# Patient Record
Sex: FEMALE | Race: WHITE | Employment: FULL TIME | URBAN - METROPOLITAN AREA
[De-identification: names, ages, dates, MRNs, and addresses within clinical notes are randomized per-mention and may not be internally consistent; named-entity substitution may affect disease eponyms.]

---

## 2019-04-10 ENCOUNTER — OFFICE VISIT (OUTPATIENT)
Dept: INTERNAL MEDICINE CLINIC | Age: 44
End: 2019-04-10
Payer: COMMERCIAL

## 2019-04-10 VITALS
HEIGHT: 62 IN | RESPIRATION RATE: 16 BRPM | HEART RATE: 84 BPM | BODY MASS INDEX: 33.13 KG/M2 | WEIGHT: 180 LBS | DIASTOLIC BLOOD PRESSURE: 82 MMHG | SYSTOLIC BLOOD PRESSURE: 122 MMHG

## 2019-04-10 DIAGNOSIS — Z00.00 PHYSICAL EXAM: Primary | ICD-10-CM

## 2019-04-10 DIAGNOSIS — Z13.0 SCREENING FOR DEFICIENCY ANEMIA: ICD-10-CM

## 2019-04-10 DIAGNOSIS — Z13.1 DIABETES MELLITUS SCREENING: ICD-10-CM

## 2019-04-10 DIAGNOSIS — Z13.220 LIPID SCREENING: ICD-10-CM

## 2019-04-10 DIAGNOSIS — Z12.39 BREAST CANCER SCREENING: ICD-10-CM

## 2019-04-10 DIAGNOSIS — E11.8 TYPE 2 DIABETES MELLITUS WITH COMPLICATION, WITHOUT LONG-TERM CURRENT USE OF INSULIN (HCC): ICD-10-CM

## 2019-04-10 DIAGNOSIS — Z12.4 CERVICAL CANCER SCREENING: ICD-10-CM

## 2019-04-10 DIAGNOSIS — Z13.29 THYROID DISORDER SCREENING: ICD-10-CM

## 2019-04-10 LAB — HBA1C MFR BLD: 11.7 %

## 2019-04-10 PROCEDURE — 99396 PREV VISIT EST AGE 40-64: CPT | Performed by: NURSE PRACTITIONER

## 2019-04-10 PROCEDURE — 83036 HEMOGLOBIN GLYCOSYLATED A1C: CPT | Performed by: NURSE PRACTITIONER

## 2019-04-10 RX ORDER — LISINOPRIL 5 MG/1
2.5 TABLET ORAL DAILY
Qty: 30 TABLET | Refills: 5 | Status: SHIPPED | OUTPATIENT
Start: 2019-04-10 | End: 2020-04-02

## 2019-04-10 RX ORDER — BUPROPION HYDROCHLORIDE 200 MG/1
200 TABLET, EXTENDED RELEASE ORAL DAILY
COMMUNITY
Start: 2018-03-13

## 2019-04-10 RX ORDER — LEVONORGESTREL AND ETHINYL ESTRADIOL 0.15-0.03
1 KIT ORAL DAILY
COMMUNITY
Start: 2019-04-04 | End: 2021-04-29

## 2019-04-10 SDOH — HEALTH STABILITY: MENTAL HEALTH: HOW OFTEN DO YOU HAVE A DRINK CONTAINING ALCOHOL?: 2-3 TIMES A WEEK

## 2019-04-10 ASSESSMENT — PATIENT HEALTH QUESTIONNAIRE - PHQ9
1. LITTLE INTEREST OR PLEASURE IN DOING THINGS: 1
SUM OF ALL RESPONSES TO PHQ QUESTIONS 1-9: 1
2. FEELING DOWN, DEPRESSED OR HOPELESS: 0
SUM OF ALL RESPONSES TO PHQ9 QUESTIONS 1 & 2: 1
SUM OF ALL RESPONSES TO PHQ QUESTIONS 1-9: 1

## 2019-04-10 ASSESSMENT — ENCOUNTER SYMPTOMS
SORE THROAT: 0
CHEST TIGHTNESS: 0
DIARRHEA: 0
TROUBLE SWALLOWING: 0
BACK PAIN: 0
NAUSEA: 0
COUGH: 0
WHEEZING: 0
SHORTNESS OF BREATH: 0
EYE PAIN: 0
SINUS PRESSURE: 0
ABDOMINAL PAIN: 0
PHOTOPHOBIA: 0
SINUS PAIN: 0
ABDOMINAL DISTENTION: 0
VOMITING: 0
RHINORRHEA: 0
CONSTIPATION: 0

## 2019-04-10 NOTE — PROGRESS NOTES
Pt is here for: physical exam, elevated blood sugars     Chief Complaint   Patient presents with    New Patient     high glucose       HPI  This is a 38 yr old CF, with a known past medical hx that includes anxiety/depression, that presents today to establish care and obtain physical exam.   Mammogram-> to be performed later today  GYN- up to date, records requested  Patient states that she had a screening test done at work in which her blood glucose was >300, presents to have evaluation today. Reports polydipsia, denies polyphagia, polyuria. Denies numbness/tingling to feet. Does report intermittent visual blurring.      /82 (Site: Left Upper Arm, Position: Sitting, Cuff Size: Large Adult)   Pulse 84   Resp 16   Ht 5' 2\" (1.575 m)   Wt 180 lb (81.6 kg)   BMI 32.92 kg/m²       Past Medical History:   Diagnosis Date    Anxiety     Depression        Past Surgical History:   Procedure Laterality Date     SECTION         No Known Allergies    Outpatient Medications Marked as Taking for the 4/10/19 encounter (Office Visit) with VICKI Rodriguez CNP   Medication Sig Dispense Refill    levonorgestrel-ethinyl estradiol (SEASONALE) 0.15-0.03 MG per tablet Take 1 tablet by mouth      buPROPion (WELLBUTRIN SR) 200 MG extended release tablet Take 200 mg by mouth         Family History   Problem Relation Age of Onset    Heart Disease Mother     High Blood Pressure Mother     Kidney Disease Mother     Colon Cancer Maternal Grandmother     Cancer Paternal Grandmother        Social History     Socioeconomic History    Marital status: Single     Spouse name: Not on file    Number of children: Not on file    Years of education: Not on file    Highest education level: Not on file   Occupational History    Not on file   Social Needs    Financial resource strain: Not on file    Food insecurity:     Worry: Not on file     Inability: Not on file    Transportation needs:     Medical: Not on file     Non-medical: Not on file   Tobacco Use    Smoking status: Former Smoker     Start date: 4/10/1989     Last attempt to quit: 2010     Years since quittin.2    Smokeless tobacco: Never Used   Substance and Sexual Activity    Alcohol use: Yes     Comment: 10/week    Drug use: Never    Sexual activity: Not on file   Lifestyle    Physical activity:     Days per week: Not on file     Minutes per session: Not on file    Stress: Not on file   Relationships    Social connections:     Talks on phone: Not on file     Gets together: Not on file     Attends Quaker service: Not on file     Active member of club or organization: Not on file     Attends meetings of clubs or organizations: Not on file     Relationship status: Not on file    Intimate partner violence:     Fear of current or ex partner: Not on file     Emotionally abused: Not on file     Physically abused: Not on file     Forced sexual activity: Not on file   Other Topics Concern    Not on file   Social History Narrative    Not on file       Review of Systems   Constitutional: Negative for activity change, appetite change, fatigue, fever and unexpected weight change. HENT: Negative for congestion, postnasal drip, rhinorrhea, sinus pressure, sinus pain, sneezing, sore throat and trouble swallowing. Eyes: Negative for photophobia and pain. Respiratory: Negative for cough, chest tightness, shortness of breath and wheezing. Cardiovascular: Negative for chest pain, palpitations and leg swelling. Gastrointestinal: Negative for abdominal distention, abdominal pain, constipation, diarrhea, nausea and vomiting. Endocrine: Positive for polyuria. Negative for polydipsia and polyphagia. Genitourinary: Negative for difficulty urinating, dysuria and hematuria. Musculoskeletal: Negative for back pain and neck pain. Skin: Negative for rash. Neurological: Negative for dizziness, tremors, syncope, weakness, numbness and headaches. Psychiatric/Behavioral: Negative for confusion, self-injury and sleep disturbance. Physical Exam   Nursing note reviewed  /82 (Site: Left Upper Arm, Position: Sitting, Cuff Size: Large Adult)   Pulse 84   Resp 16   Ht 5' 2\" (1.575 m)   Wt 180 lb (81.6 kg)   BMI 32.92 kg/m²   BP Readings from Last 3 Encounters:   04/10/19 122/82     Wt Readings from Last 3 Encounters:   04/10/19 180 lb (81.6 kg)      Body mass index is 32.92 kg/m². No results found for this visit on 04/10/19. Lab Review   No visits with results within 2 Month(s) from this visit. Latest known visit with results is:   No results found for any previous visit. Physical Exam   Constitutional: She is oriented to person, place, and time. She appears well-developed and well-nourished. HENT:   Head: Normocephalic. Right Ear: No tenderness. Tympanic membrane is not erythematous. Left Ear: No tenderness. Tympanic membrane is not erythematous. Nose: Right sinus exhibits no maxillary sinus tenderness and no frontal sinus tenderness. Left sinus exhibits no maxillary sinus tenderness and no frontal sinus tenderness. Mouth/Throat: No oropharyngeal exudate, posterior oropharyngeal edema, posterior oropharyngeal erythema or tonsillar abscesses. Eyes: Pupils are equal, round, and reactive to light. Right eye exhibits no discharge. Left eye exhibits no discharge. Neck: Normal range of motion. No JVD present. No tracheal deviation present. No thyromegaly present. Cardiovascular: Normal rate, regular rhythm and normal heart sounds. No murmur heard. Pulmonary/Chest: Effort normal and breath sounds normal. No stridor. No respiratory distress. She has no decreased breath sounds. She has no wheezes. She has no rales. She exhibits no tenderness. Abdominal: Soft. Bowel sounds are normal. She exhibits no distension and no mass. There is no tenderness. There is no rebound and no guarding.    Musculoskeletal: Normal range of motion. She exhibits no edema or tenderness. Lymphadenopathy:     She has no cervical adenopathy. She has no axillary adenopathy. Neurological: She is alert and oriented to person, place, and time. She has normal reflexes. She displays normal reflexes. No cranial nerve deficit or sensory deficit. She exhibits normal muscle tone. Coordination normal.   Skin: Skin is warm and dry. No rash noted. No erythema. Psychiatric: She has a normal mood and affect. Her behavior is normal.        Assessment/Plan   Parminder Crystal was seen today for new patient. Diagnoses and all orders for this visit:    Physical exam    Thyroid disorder screening  -     TSH WITH REFLEX TO FT4; Future    Lipid screening  -     LIPID PANEL; Future    Diabetes mellitus screening  -     POCT glycosylated hemoglobin (Hb A1C) 11.7%  -Will start Metformin/ACE at this time per recommendations    Cervical cancer screening  -up to date    Breast cancer screening  -Mammogram scheduled later today     Screening for deficiency anemia  -     CBC; Future  -     COMPREHENSIVE METABOLIC PANEL; Future  -     VITAMIN D 25 HYDROXY; Future    Will see in f/u in 3 months for repeat lab work in regards to DM  All questions addressed and answered. Patient agrees with plan of care. No follow-ups on file.

## 2019-04-17 ENCOUNTER — TELEPHONE (OUTPATIENT)
Dept: INTERNAL MEDICINE CLINIC | Age: 44
End: 2019-04-17

## 2019-04-17 ENCOUNTER — NURSE ONLY (OUTPATIENT)
Dept: INTERNAL MEDICINE CLINIC | Age: 44
End: 2019-04-17
Payer: COMMERCIAL

## 2019-04-17 DIAGNOSIS — Z13.0 SCREENING FOR DEFICIENCY ANEMIA: ICD-10-CM

## 2019-04-17 DIAGNOSIS — Z13.220 LIPID SCREENING: ICD-10-CM

## 2019-04-17 DIAGNOSIS — Z13.29 THYROID DISORDER SCREENING: ICD-10-CM

## 2019-04-17 LAB
A/G RATIO: 1.1 (ref 1.1–2.2)
ALBUMIN SERPL-MCNC: 4.1 G/DL (ref 3.4–5)
ALP BLD-CCNC: 132 U/L (ref 40–129)
ALT SERPL-CCNC: 19 U/L (ref 10–40)
ANION GAP SERPL CALCULATED.3IONS-SCNC: 14 MMOL/L (ref 3–16)
AST SERPL-CCNC: 20 U/L (ref 15–37)
BILIRUB SERPL-MCNC: <0.2 MG/DL (ref 0–1)
BUN BLDV-MCNC: 6 MG/DL (ref 7–20)
CALCIUM SERPL-MCNC: 9.6 MG/DL (ref 8.3–10.6)
CHLORIDE BLD-SCNC: 102 MMOL/L (ref 99–110)
CHOLESTEROL, TOTAL: 175 MG/DL (ref 0–199)
CO2: 23 MMOL/L (ref 21–32)
CREAT SERPL-MCNC: 0.6 MG/DL (ref 0.6–1.1)
GFR AFRICAN AMERICAN: >60
GFR NON-AFRICAN AMERICAN: >60
GLOBULIN: 3.7 G/DL
GLUCOSE BLD-MCNC: 264 MG/DL (ref 70–99)
HDLC SERPL-MCNC: 30 MG/DL (ref 40–60)
LDL CHOLESTEROL CALCULATED: 96 MG/DL
POTASSIUM SERPL-SCNC: 4.5 MMOL/L (ref 3.5–5.1)
REASON FOR REJECTION: NORMAL
REJECTED TEST: NORMAL
SODIUM BLD-SCNC: 139 MMOL/L (ref 136–145)
TOTAL PROTEIN: 7.8 G/DL (ref 6.4–8.2)
TRIGL SERPL-MCNC: 246 MG/DL (ref 0–150)
TSH REFLEX FT4: 1.77 UIU/ML (ref 0.27–4.2)
VITAMIN D 25-HYDROXY: 21.9 NG/ML
VLDLC SERPL CALC-MCNC: 49 MG/DL

## 2019-04-17 PROCEDURE — 36415 COLL VENOUS BLD VENIPUNCTURE: CPT | Performed by: NURSE PRACTITIONER

## 2019-04-18 DIAGNOSIS — E78.2 MIXED HYPERLIPIDEMIA: Primary | ICD-10-CM

## 2019-04-18 RX ORDER — FENOFIBRATE 145 MG/1
145 TABLET, COATED ORAL DAILY
Qty: 30 TABLET | Refills: 3 | Status: SHIPPED | OUTPATIENT
Start: 2019-04-18 | End: 2019-08-11 | Stop reason: SDUPTHER

## 2019-04-25 ENCOUNTER — E-VISIT (OUTPATIENT)
Dept: INTERNAL MEDICINE CLINIC | Age: 44
End: 2019-04-25
Payer: COMMERCIAL

## 2019-04-25 ENCOUNTER — NURSE ONLY (OUTPATIENT)
Dept: INTERNAL MEDICINE CLINIC | Age: 44
End: 2019-04-25
Payer: COMMERCIAL

## 2019-04-25 DIAGNOSIS — E78.2 MIXED HYPERLIPIDEMIA: Primary | ICD-10-CM

## 2019-04-25 DIAGNOSIS — J01.10 ACUTE NON-RECURRENT FRONTAL SINUSITIS: Primary | ICD-10-CM

## 2019-04-25 LAB
BASOPHILS ABSOLUTE: 0.1 K/UL (ref 0–0.2)
BASOPHILS RELATIVE PERCENT: 0.6 %
EOSINOPHILS ABSOLUTE: 0.1 K/UL (ref 0–0.6)
EOSINOPHILS RELATIVE PERCENT: 0.4 %
HCT VFR BLD CALC: 36.4 % (ref 36–48)
HEMOGLOBIN: 12 G/DL (ref 12–16)
LYMPHOCYTES ABSOLUTE: 3.1 K/UL (ref 1–5.1)
LYMPHOCYTES RELATIVE PERCENT: 16.9 %
MCH RBC QN AUTO: 27.7 PG (ref 26–34)
MCHC RBC AUTO-ENTMCNC: 32.9 G/DL (ref 31–36)
MCV RBC AUTO: 84.1 FL (ref 80–100)
MONOCYTES ABSOLUTE: 1.1 K/UL (ref 0–1.3)
MONOCYTES RELATIVE PERCENT: 5.9 %
NEUTROPHILS ABSOLUTE: 13.9 K/UL (ref 1.7–7.7)
NEUTROPHILS RELATIVE PERCENT: 76.2 %
PDW BLD-RTO: 13.3 % (ref 12.4–15.4)
PLATELET # BLD: 486 K/UL (ref 135–450)
PMV BLD AUTO: 9.1 FL (ref 5–10.5)
RBC # BLD: 4.33 M/UL (ref 4–5.2)
WBC # BLD: 18.3 K/UL (ref 4–11)

## 2019-04-25 PROCEDURE — 98969 PR NONPHYSICIAN ONLINE ASSESSMENT AND MANAGEMENT: CPT | Performed by: NURSE PRACTITIONER

## 2019-04-25 PROCEDURE — 36415 COLL VENOUS BLD VENIPUNCTURE: CPT | Performed by: NURSE PRACTITIONER

## 2019-04-25 RX ORDER — AZITHROMYCIN 250 MG/1
TABLET, FILM COATED ORAL
Qty: 1 PACKET | Refills: 0 | Status: SHIPPED | OUTPATIENT
Start: 2019-04-25 | End: 2019-05-05

## 2019-04-25 ASSESSMENT — LIFESTYLE VARIABLES
PACKS_PER_DAY: 0
SMOKING_STATUS: NO, I'M A FORMER SMOKER
SMOKING_YEARS: 15

## 2019-05-09 ENCOUNTER — NURSE ONLY (OUTPATIENT)
Dept: INTERNAL MEDICINE CLINIC | Age: 44
End: 2019-05-09
Payer: COMMERCIAL

## 2019-05-09 DIAGNOSIS — E78.2 MIXED HYPERLIPIDEMIA: Primary | ICD-10-CM

## 2019-05-09 LAB
BASOPHILS ABSOLUTE: 0.1 K/UL (ref 0–0.2)
BASOPHILS RELATIVE PERCENT: 0.8 %
EOSINOPHILS ABSOLUTE: 0.1 K/UL (ref 0–0.6)
EOSINOPHILS RELATIVE PERCENT: 0.5 %
HCT VFR BLD CALC: 36.7 % (ref 36–48)
HEMOGLOBIN: 11.9 G/DL (ref 12–16)
LYMPHOCYTES ABSOLUTE: 4.3 K/UL (ref 1–5.1)
LYMPHOCYTES RELATIVE PERCENT: 30 %
MCH RBC QN AUTO: 26.9 PG (ref 26–34)
MCHC RBC AUTO-ENTMCNC: 32.3 G/DL (ref 31–36)
MCV RBC AUTO: 83.2 FL (ref 80–100)
MONOCYTES ABSOLUTE: 0.6 K/UL (ref 0–1.3)
MONOCYTES RELATIVE PERCENT: 4.1 %
NEUTROPHILS ABSOLUTE: 9.2 K/UL (ref 1.7–7.7)
NEUTROPHILS RELATIVE PERCENT: 64.6 %
PDW BLD-RTO: 13.2 % (ref 12.4–15.4)
PLATELET # BLD: 590 K/UL (ref 135–450)
PMV BLD AUTO: 8.5 FL (ref 5–10.5)
RBC # BLD: 4.41 M/UL (ref 4–5.2)
WBC # BLD: 14.2 K/UL (ref 4–11)

## 2019-05-09 PROCEDURE — 36415 COLL VENOUS BLD VENIPUNCTURE: CPT | Performed by: NURSE PRACTITIONER

## 2019-05-16 ENCOUNTER — NURSE ONLY (OUTPATIENT)
Dept: INTERNAL MEDICINE CLINIC | Age: 44
End: 2019-05-16
Payer: COMMERCIAL

## 2019-05-16 DIAGNOSIS — D72.9 ABNORMAL WBC COUNT: Primary | ICD-10-CM

## 2019-05-16 LAB
BASOPHILS ABSOLUTE: 0.1 K/UL (ref 0–0.2)
BASOPHILS RELATIVE PERCENT: 0.8 %
EOSINOPHILS ABSOLUTE: 0.1 K/UL (ref 0–0.6)
EOSINOPHILS RELATIVE PERCENT: 0.7 %
HCT VFR BLD CALC: 37 % (ref 36–48)
HEMOGLOBIN: 12 G/DL (ref 12–16)
LYMPHOCYTES ABSOLUTE: 4.3 K/UL (ref 1–5.1)
LYMPHOCYTES RELATIVE PERCENT: 28.3 %
MCH RBC QN AUTO: 27 PG (ref 26–34)
MCHC RBC AUTO-ENTMCNC: 32.5 G/DL (ref 31–36)
MCV RBC AUTO: 83.3 FL (ref 80–100)
MONOCYTES ABSOLUTE: 0.7 K/UL (ref 0–1.3)
MONOCYTES RELATIVE PERCENT: 4.8 %
NEUTROPHILS ABSOLUTE: 10 K/UL (ref 1.7–7.7)
NEUTROPHILS RELATIVE PERCENT: 65.4 %
PDW BLD-RTO: 13.5 % (ref 12.4–15.4)
PLATELET # BLD: 540 K/UL (ref 135–450)
PMV BLD AUTO: 8.7 FL (ref 5–10.5)
RBC # BLD: 4.44 M/UL (ref 4–5.2)
WBC # BLD: 15.3 K/UL (ref 4–11)

## 2019-05-16 PROCEDURE — 36415 COLL VENOUS BLD VENIPUNCTURE: CPT | Performed by: NURSE PRACTITIONER

## 2019-05-28 ENCOUNTER — HOSPITAL ENCOUNTER (EMERGENCY)
Age: 44
Discharge: HOME | End: 2019-05-28
Payer: COMMERCIAL

## 2019-05-28 ENCOUNTER — APPOINTMENT (OUTPATIENT)
Dept: ULTRASOUND IMAGING | Age: 44
End: 2019-05-28
Payer: COMMERCIAL

## 2019-05-28 ENCOUNTER — APPOINTMENT (OUTPATIENT)
Dept: CT IMAGING | Age: 44
End: 2019-05-28
Payer: COMMERCIAL

## 2019-05-28 VITALS
OXYGEN SATURATION: 99 % | TEMPERATURE: 98.7 F | SYSTOLIC BLOOD PRESSURE: 151 MMHG | RESPIRATION RATE: 16 BRPM | DIASTOLIC BLOOD PRESSURE: 81 MMHG | WEIGHT: 178.79 LBS | HEIGHT: 61 IN | BODY MASS INDEX: 33.76 KG/M2 | HEART RATE: 90 BPM

## 2019-05-28 DIAGNOSIS — D72.829 LEUKOCYTOSIS, UNSPECIFIED TYPE: ICD-10-CM

## 2019-05-28 DIAGNOSIS — R10.11 ACUTE ABDOMINAL PAIN IN RIGHT UPPER QUADRANT: ICD-10-CM

## 2019-05-28 DIAGNOSIS — R93.2 ABNORMAL ULTRASOUND OF LIVER: ICD-10-CM

## 2019-05-28 DIAGNOSIS — K80.21 CALCULUS OF GALLBLADDER WITH BILIARY OBSTRUCTION BUT WITHOUT CHOLECYSTITIS: Primary | ICD-10-CM

## 2019-05-28 LAB
A/G RATIO: 0.9 (ref 1.1–2.2)
ALBUMIN SERPL-MCNC: 4.2 G/DL (ref 3.4–5)
ALP BLD-CCNC: 90 U/L (ref 40–129)
ALT SERPL-CCNC: 10 U/L (ref 10–40)
ANION GAP SERPL CALCULATED.3IONS-SCNC: 16 MMOL/L (ref 3–16)
AST SERPL-CCNC: 11 U/L (ref 15–37)
BASOPHILS ABSOLUTE: 0.1 K/UL (ref 0–0.2)
BASOPHILS RELATIVE PERCENT: 0.4 %
BILIRUB SERPL-MCNC: 0.3 MG/DL (ref 0–1)
BILIRUBIN URINE: NEGATIVE
BLOOD, URINE: NEGATIVE
BUN BLDV-MCNC: 7 MG/DL (ref 7–20)
CALCIUM SERPL-MCNC: 10 MG/DL (ref 8.3–10.6)
CHLORIDE BLD-SCNC: 96 MMOL/L (ref 99–110)
CLARITY: CLEAR
CO2: 22 MMOL/L (ref 21–32)
COLOR: YELLOW
CREAT SERPL-MCNC: <0.5 MG/DL (ref 0.6–1.1)
EOSINOPHILS ABSOLUTE: 0 K/UL (ref 0–0.6)
EOSINOPHILS RELATIVE PERCENT: 0.2 %
GFR AFRICAN AMERICAN: >60
GFR NON-AFRICAN AMERICAN: >60
GLOBULIN: 4.5 G/DL
GLUCOSE BLD-MCNC: 236 MG/DL (ref 70–99)
GLUCOSE URINE: >=1000 MG/DL
HCG(URINE) PREGNANCY TEST: NEGATIVE
HCT VFR BLD CALC: 39.4 % (ref 36–48)
HEMOGLOBIN: 12.8 G/DL (ref 12–16)
KETONES, URINE: NEGATIVE MG/DL
LEUKOCYTE ESTERASE, URINE: NEGATIVE
LIPASE: 20 U/L (ref 13–60)
LYMPHOCYTES ABSOLUTE: 2.8 K/UL (ref 1–5.1)
LYMPHOCYTES RELATIVE PERCENT: 14 %
MCH RBC QN AUTO: 26.5 PG (ref 26–34)
MCHC RBC AUTO-ENTMCNC: 32.5 G/DL (ref 31–36)
MCV RBC AUTO: 81.4 FL (ref 80–100)
MICROSCOPIC EXAMINATION: ABNORMAL
MONOCYTES ABSOLUTE: 0.7 K/UL (ref 0–1.3)
MONOCYTES RELATIVE PERCENT: 3.4 %
NEUTROPHILS ABSOLUTE: 16.5 K/UL (ref 1.7–7.7)
NEUTROPHILS RELATIVE PERCENT: 82 %
NITRITE, URINE: NEGATIVE
PDW BLD-RTO: 13.5 % (ref 12.4–15.4)
PH UA: 5 (ref 5–8)
PLATELET # BLD: 508 K/UL (ref 135–450)
PMV BLD AUTO: 8.6 FL (ref 5–10.5)
POTASSIUM REFLEX MAGNESIUM: 3.8 MMOL/L (ref 3.5–5.1)
PROTEIN UA: NEGATIVE MG/DL
RBC # BLD: 4.84 M/UL (ref 4–5.2)
SODIUM BLD-SCNC: 134 MMOL/L (ref 136–145)
SPECIFIC GRAVITY UA: 1.02 (ref 1–1.03)
TOTAL PROTEIN: 8.7 G/DL (ref 6.4–8.2)
URINE REFLEX TO CULTURE: ABNORMAL
URINE TYPE: ABNORMAL
UROBILINOGEN, URINE: 0.2 E.U./DL
WBC # BLD: 20.1 K/UL (ref 4–11)

## 2019-05-28 PROCEDURE — 81003 URINALYSIS AUTO W/O SCOPE: CPT

## 2019-05-28 PROCEDURE — 99284 EMERGENCY DEPT VISIT MOD MDM: CPT

## 2019-05-28 PROCEDURE — 2500000003 HC RX 250 WO HCPCS: Performed by: PHYSICIAN ASSISTANT

## 2019-05-28 PROCEDURE — 96375 TX/PRO/DX INJ NEW DRUG ADDON: CPT

## 2019-05-28 PROCEDURE — 96374 THER/PROPH/DIAG INJ IV PUSH: CPT

## 2019-05-28 PROCEDURE — 80053 COMPREHEN METABOLIC PANEL: CPT

## 2019-05-28 PROCEDURE — 85025 COMPLETE CBC W/AUTO DIFF WBC: CPT

## 2019-05-28 PROCEDURE — 6360000004 HC RX CONTRAST MEDICATION: Performed by: PHYSICIAN ASSISTANT

## 2019-05-28 PROCEDURE — 83690 ASSAY OF LIPASE: CPT

## 2019-05-28 PROCEDURE — 6360000002 HC RX W HCPCS: Performed by: PHYSICIAN ASSISTANT

## 2019-05-28 PROCEDURE — 74177 CT ABD & PELVIS W/CONTRAST: CPT

## 2019-05-28 PROCEDURE — 6370000000 HC RX 637 (ALT 250 FOR IP): Performed by: PHYSICIAN ASSISTANT

## 2019-05-28 PROCEDURE — 2580000003 HC RX 258: Performed by: PHYSICIAN ASSISTANT

## 2019-05-28 PROCEDURE — 76705 ECHO EXAM OF ABDOMEN: CPT

## 2019-05-28 PROCEDURE — 96361 HYDRATE IV INFUSION ADD-ON: CPT

## 2019-05-28 PROCEDURE — 84703 CHORIONIC GONADOTROPIN ASSAY: CPT

## 2019-05-28 RX ORDER — DICYCLOMINE HCL 20 MG
20 TABLET ORAL EVERY 6 HOURS PRN
Qty: 20 TABLET | Refills: 0 | Status: SHIPPED | OUTPATIENT
Start: 2019-05-28 | End: 2021-04-29 | Stop reason: ALTCHOICE

## 2019-05-28 RX ORDER — ONDANSETRON 4 MG/1
4-8 TABLET, ORALLY DISINTEGRATING ORAL EVERY 8 HOURS PRN
Qty: 20 TABLET | Refills: 0 | Status: SHIPPED | OUTPATIENT
Start: 2019-05-28 | End: 2021-04-29

## 2019-05-28 RX ORDER — DICYCLOMINE HYDROCHLORIDE 10 MG/1
20 CAPSULE ORAL ONCE
Status: COMPLETED | OUTPATIENT
Start: 2019-05-28 | End: 2019-05-28

## 2019-05-28 RX ORDER — KETOROLAC TROMETHAMINE 30 MG/ML
15 INJECTION, SOLUTION INTRAMUSCULAR; INTRAVENOUS ONCE
Status: COMPLETED | OUTPATIENT
Start: 2019-05-28 | End: 2019-05-28

## 2019-05-28 RX ORDER — IBUPROFEN 600 MG/1
600 TABLET ORAL EVERY 6 HOURS PRN
Qty: 20 TABLET | Refills: 0 | Status: SHIPPED | OUTPATIENT
Start: 2019-05-28

## 2019-05-28 RX ORDER — ONDANSETRON 2 MG/ML
4 INJECTION INTRAMUSCULAR; INTRAVENOUS EVERY 30 MIN PRN
Status: DISCONTINUED | OUTPATIENT
Start: 2019-05-28 | End: 2019-05-28 | Stop reason: HOSPADM

## 2019-05-28 RX ORDER — SODIUM CHLORIDE, SODIUM LACTATE, POTASSIUM CHLORIDE, CALCIUM CHLORIDE 600; 310; 30; 20 MG/100ML; MG/100ML; MG/100ML; MG/100ML
1000 INJECTION, SOLUTION INTRAVENOUS ONCE
Status: COMPLETED | OUTPATIENT
Start: 2019-05-28 | End: 2019-05-28

## 2019-05-28 RX ADMIN — IOPAMIDOL 75 ML: 755 INJECTION, SOLUTION INTRAVENOUS at 13:31

## 2019-05-28 RX ADMIN — ONDANSETRON 4 MG: 2 INJECTION INTRAMUSCULAR; INTRAVENOUS at 12:22

## 2019-05-28 RX ADMIN — DICYCLOMINE HYDROCHLORIDE 20 MG: 10 CAPSULE ORAL at 12:21

## 2019-05-28 RX ADMIN — Medication 20 MG: at 12:25

## 2019-05-28 RX ADMIN — SODIUM CHLORIDE, POTASSIUM CHLORIDE, SODIUM LACTATE AND CALCIUM CHLORIDE 1000 ML: 600; 310; 30; 20 INJECTION, SOLUTION INTRAVENOUS at 12:21

## 2019-05-28 RX ADMIN — KETOROLAC TROMETHAMINE 15 MG: 30 INJECTION, SOLUTION INTRAMUSCULAR at 12:23

## 2019-05-28 ASSESSMENT — PAIN SCALES - GENERAL
PAINLEVEL_OUTOF10: 2
PAINLEVEL_OUTOF10: 7
PAINLEVEL_OUTOF10: 2
PAINLEVEL_OUTOF10: 2
PAINLEVEL_OUTOF10: 5

## 2019-05-28 ASSESSMENT — PAIN DESCRIPTION - PROGRESSION: CLINICAL_PROGRESSION: GRADUALLY WORSENING

## 2019-05-28 ASSESSMENT — PAIN DESCRIPTION - ONSET: ONSET: PROGRESSIVE

## 2019-05-28 ASSESSMENT — PAIN DESCRIPTION - FREQUENCY: FREQUENCY: CONTINUOUS

## 2019-05-28 ASSESSMENT — PAIN DESCRIPTION - LOCATION: LOCATION: ABDOMEN

## 2019-05-28 ASSESSMENT — PAIN DESCRIPTION - PAIN TYPE: TYPE: ACUTE PAIN

## 2019-05-28 ASSESSMENT — PAIN - FUNCTIONAL ASSESSMENT
PAIN_FUNCTIONAL_ASSESSMENT: 0-10
PAIN_FUNCTIONAL_ASSESSMENT: PREVENTS OR INTERFERES SOME ACTIVE ACTIVITIES AND ADLS

## 2019-05-28 ASSESSMENT — PAIN DESCRIPTION - DESCRIPTORS: DESCRIPTORS: DULL

## 2019-05-28 ASSESSMENT — PAIN DESCRIPTION - ORIENTATION: ORIENTATION: RIGHT;UPPER

## 2019-05-28 NOTE — ED TRIAGE NOTES
C/o RUQ abd pain that began last night with progressive worsening to present. (+) nausea. No vomiting or diarrhea. Pt a & o x 3. Skin w/d, color pink. resp easy. Price (=) without deficit. Ambulatory with steady gait. Nad.

## 2019-05-28 NOTE — ED TRIAGE NOTES
Pt A&O to ED with c/o RUQ abd pain that started last night, states that she has nausea but no v/d. Area is tender to the touch.

## 2019-05-28 NOTE — ED NOTES
Discharge and education instructions reviewed. Patient verbalized understanding, teach-back successful. Patient denied questions at this time. No acute distress noted. Patient instructed to follow-up as noted - return to emergency department if symptoms worsen. Patient verbalized understanding. Discharged per EDMD with discharged instructions.        Kameron Matta RN  05/28/19 4129

## 2019-05-28 NOTE — ED PROVIDER NOTES
(PRINIVIL;ZESTRIL) 5 MG tablet Take 0.5 tablets by mouth daily 30 tablet 5       ALLERGIES    No Known Allergies    SOCIAL AND FAMILY HISTORY    Social History     Socioeconomic History    Marital status: Single     Spouse name: None    Number of children: None    Years of education: None    Highest education level: None   Occupational History    Occupation: surveillence    Social Needs    Financial resource strain: None    Food insecurity:     Worry: None     Inability: None    Transportation needs:     Medical: None     Non-medical: None   Tobacco Use    Smoking status: Former Smoker     Start date: 4/10/1989     Last attempt to quit: 2010     Years since quittin.4    Smokeless tobacco: Never Used   Substance and Sexual Activity    Alcohol use: Yes     Frequency: 2-3 times a week     Comment: 10/week    Drug use: Never    Sexual activity: Yes     Birth control/protection: Pill   Lifestyle    Physical activity:     Days per week: None     Minutes per session: None    Stress: None   Relationships    Social connections:     Talks on phone: None     Gets together: None     Attends Catholic service: None     Active member of club or organization: None     Attends meetings of clubs or organizations: None     Relationship status: None    Intimate partner violence:     Fear of current or ex partner: None     Emotionally abused: None     Physically abused: None     Forced sexual activity: None   Other Topics Concern    None   Social History Narrative    None     Family History   Problem Relation Age of Onset    Heart Disease Mother     High Blood Pressure Mother     Kidney Disease Mother     Colon Cancer Maternal Grandmother     Cancer Paternal Grandmother     Diabetes Father        PHYSICAL EXAM    VITAL SIGNS: BP (!) 172/108   Pulse 99   Temp 98.7 °F (37.1 °C) (Oral)   Resp 18   Ht 5' 1\" (1.549 m)   Wt 178 lb 12.7 oz (81.1 kg)   SpO2 99%   BMI 33.78 kg/m²   Constitutional: Well developed, well nourished, no acute distress  Eyes:  Sclera nonicteric, conjunctiva moist  HENT:  Atraumatic, nose normal  Neck: Supple, no JVD  Respiratory:  No retractions, no accessory muscle use, normal breath sounds   Cardiovascular:  regular rate, no murmurs  GI: +mild RUQ abdominal tenderness to palpation, soft, no guarding, bowel sounds present, no audible bruits or palpable pulsatile masses  Back: no CVA tenderness  Musculoskeletal:  No edema, no acute deformities  Vascular: Radial and DP pulses 2+ and equal bilaterally  Integument: No rashes, skin dry  Neurologic:  Alert & oriented, no slurred speech  Psychiatric: Cooperative, pleasant affect     RADIOLOGY/PROCEDURES    CT ABDOMEN PELVIS W IV CONTRAST Additional Contrast? None   Final Result   1. No acute process demonstrated   2. Cholelithiasis   3. Liver seen on recent ultrasound are not demonstrated on this exam,   presumably due to isodense contrast \"fill in\". If the patient has risk   factors for primary or metastatic liver malignancy, then MRI would be   considered the next step in imaging evaluation         US GALLBLADDER RUQ   Final Result   1. Indeterminate hepatic masses. CT recommended for further evaluation. 2. Cholelithiasis. ED COURSE & MEDICAL DECISION MAKING    Pertinent Labs & Imaging studies reviewed and interpreted. (See chart for details)     See chart for details of medications given during the ED stay.     Vitals:    05/28/19 1107   BP: (!) 172/108   Pulse: 99   Resp: 18   Temp: 98.7 °F (37.1 °C)   TempSrc: Oral   SpO2: 99%   Weight: 178 lb 12.7 oz (81.1 kg)   Height: 5' 1\" (1.549 m)       Differential diagnosis: Abdominal Aortic Aneurysm, Acute Coronary Syndrome, Ischemic Bowel, Bowel Obstruction (including Gastric Outlet Obstruction), PUD, GERD, Acute Cholecystitis, Pancreatitis, Hepatitis, Colitis, SMA Syndrome, Mesenteric Steal Syndrome, Splanchnic Vein Thrombosis, other    Patient is afebrile and nontoxic in appearance. Labs reveal leukocytosis of 20,100. Patient has history of unexplained leukocytosis, chart review reveals leukocytosis of 18,800 as far back as 5/28/2014 that was diagnosed as \"leukocytosis, unspecified\" by her PCP. She will need follow-up with hematologist for further evaluation of this. Glucose 236, elevated, consistent with patient's history of diabetes. Metabolic panel otherwise unremarkable. Lipase within normal limits. Urinalysis unremarkable. US findings as above, cholelithiasis. Considering the patient's classic presentation, she likely has pain secondary to biliary colic. I recommended bland diet, referral will be provided to general surgery. Incidental finding of hepatic masses was discussed with the patient, CT was ordered and no masses were noted, radiology read as above. Patient understands that she will follow up as an outpatient regarding these findings. Reevaluation at 2:15 PM: Patient is resting comfortably, reports resolution of her symptoms. I believe the patient is safe for discharge at this time. The patient was instructed to follow up as an outpatient in 2 days. The patient was instructed to return to the ED immediately for any new or worsening symptoms. The patient verbalized understanding. I have evaluated this patient. My supervising physician was available for consultation. FINAL IMPRESSION    1. Calculus of gallbladder with biliary obstruction but without cholecystitis    2. Acute abdominal pain in right upper quadrant    3. Leukocytosis, unspecified type    4.  Abnormal ultrasound of liver        PLAN  Discharge with outpatient follow-up    (Please note that this note was completed with a voice recognition program.  Every attempt was made to edit the dictations, but inevitably there remain words that are mis-transcribed.)          Romero Gilbert  05/28/19 0425

## 2019-05-29 ENCOUNTER — OFFICE VISIT (OUTPATIENT)
Dept: SURGERY | Age: 44
End: 2019-05-29
Payer: COMMERCIAL

## 2019-05-29 VITALS
DIASTOLIC BLOOD PRESSURE: 84 MMHG | WEIGHT: 176.6 LBS | SYSTOLIC BLOOD PRESSURE: 116 MMHG | BODY MASS INDEX: 33.34 KG/M2 | HEART RATE: 124 BPM | HEIGHT: 61 IN

## 2019-05-29 DIAGNOSIS — K80.20 SYMPTOMATIC CHOLELITHIASIS: Primary | ICD-10-CM

## 2019-05-29 DIAGNOSIS — D72.9 NEUTROPHILIC LEUKOCYTOSIS: ICD-10-CM

## 2019-05-29 PROCEDURE — 99204 OFFICE O/P NEW MOD 45 MIN: CPT | Performed by: SURGERY

## 2019-05-29 ASSESSMENT — ENCOUNTER SYMPTOMS
BLOOD IN STOOL: 0
ABDOMINAL PAIN: 1
NAUSEA: 1
RESPIRATORY NEGATIVE: 1

## 2019-05-29 NOTE — PROGRESS NOTES
Subjective:      Patient ID: Onesimo Bower is a 38 y.o. female. HPI  Chief Complaint: abdominal pain  Patient referred by ED for evaluation of gallbladder. Patient reports symptoms of pain, nausea. Location of symptoms is RUQ. Symptoms were first noted 3 days ago after eating omelette. no similar symptoms in past.  Pain now resolved. Denies fever, chills, nausea, dark stools, unintentional weight loss. Previous evaluation includes U/S with stones, hepatic masses. Hepatic lesions not demonstrated on CT. Looking back at chart patient with chronic leukocytosis and elevated platelets. Patient has a history of newly diagnosed DM. Will plan following treatment: referral to hematology, then lap cholecystectomy. Past Medical History:   Diagnosis Date    Anxiety     Depression     Diabetes mellitus (HCC)        Past Surgical History:   Procedure Laterality Date     SECTION      CYST REMOVAL      removal to bilateral eyes        Current Outpatient Medications   Medication Sig Dispense Refill    ibuprofen (ADVIL;MOTRIN) 600 MG tablet Take 1 tablet by mouth every 6 hours as needed for Pain 20 tablet 0    dicyclomine (BENTYL) 20 MG tablet Take 1 tablet by mouth every 6 hours as needed (cramps) 20 tablet 0    ondansetron (ZOFRAN ODT) 4 MG disintegrating tablet Take 1-2 tablets by mouth every 8 hours as needed for Nausea May Sub regular tablet (non-ODT) if insurance does not cover ODT.  20 tablet 0    fenofibrate (TRICOR) 145 MG tablet Take 1 tablet by mouth daily 30 tablet 3    levonorgestrel-ethinyl estradiol (SEASONALE) 0.15-0.03 MG per tablet Take 1 tablet by mouth      buPROPion (WELLBUTRIN SR) 200 MG extended release tablet Take 200 mg by mouth      metFORMIN (GLUCOPHAGE) 500 MG tablet Take 1 tablet by mouth 2 times daily (with meals) 60 tablet 5    lisinopril (PRINIVIL;ZESTRIL) 5 MG tablet Take 0.5 tablets by mouth daily 30 tablet 5     No current facility-administered medications for this visit.        Prior to Admission medications    Medication Sig Start Date End Date Taking?  Authorizing Provider   ibuprofen (ADVIL;MOTRIN) 600 MG tablet Take 1 tablet by mouth every 6 hours as needed for Pain 19  Yes TESSA Beverly   dicyclomine (BENTYL) 20 MG tablet Take 1 tablet by mouth every 6 hours as needed (cramps) 19  Yes TESSA Beverly   ondansetron (ZOFRAN ODT) 4 MG disintegrating tablet Take 1-2 tablets by mouth every 8 hours as needed for Nausea May Sub regular tablet (non-ODT) if insurance does not cover ODT. 19  Yes TESSA Beverly   fenofibrate (TRICOR) 145 MG tablet Take 1 tablet by mouth daily 19  Yes VICKI Griggs CNP   levonorgestrel-ethinyl estradiol (Ramses Inches) 0.15-0.03 MG per tablet Take 1 tablet by mouth 19  Yes Historical Provider, MD   buPROPion (WELLBUTRIN SR) 200 MG extended release tablet Take 200 mg by mouth 3/13/18  Yes Historical Provider, MD   metFORMIN (GLUCOPHAGE) 500 MG tablet Take 1 tablet by mouth 2 times daily (with meals) 4/10/19  Yes VICKI Maciel CNP   lisinopril (PRINIVIL;ZESTRIL) 5 MG tablet Take 0.5 tablets by mouth daily 4/10/19  Yes VICKI Maciel CNP         No Known Allergies    Social History     Socioeconomic History    Marital status: Single     Spouse name: Not on file    Number of children: Not on file    Years of education: Not on file    Highest education level: Not on file   Occupational History    Occupation: surveillence    Social Needs    Financial resource strain: Not on file    Food insecurity:     Worry: Not on file     Inability: Not on file    Transportation needs:     Medical: Not on file     Non-medical: Not on file   Tobacco Use    Smoking status: Former Smoker     Start date: 4/10/1989     Last attempt to quit: 2010     Years since quittin.4    Smokeless tobacco: Never Used   Substance and Sexual Activity    Alcohol use: Yes     Frequency: 2-3 times a week     Comment: 10/week    Drug use: Never    Sexual activity: Yes     Birth control/protection: Pill   Lifestyle    Physical activity:     Days per week: Not on file     Minutes per session: Not on file    Stress: Not on file   Relationships    Social connections:     Talks on phone: Not on file     Gets together: Not on file     Attends Restorationist service: Not on file     Active member of club or organization: Not on file     Attends meetings of clubs or organizations: Not on file     Relationship status: Not on file    Intimate partner violence:     Fear of current or ex partner: Not on file     Emotionally abused: Not on file     Physically abused: Not on file     Forced sexual activity: Not on file   Other Topics Concern    Not on file   Social History Narrative    Not on file       Family History   Problem Relation Age of Onset    Heart Disease Mother     High Blood Pressure Mother     Kidney Disease Mother     Colon Cancer Maternal Grandmother     Cancer Paternal Grandmother     Diabetes Father        Review of Systems   Constitutional: Negative. Negative for chills, diaphoresis, fever and unexpected weight change. Respiratory: Negative. Cardiovascular: Negative. Gastrointestinal: Positive for abdominal pain and nausea. Negative for blood in stool. Hematological: Negative. All other systems reviewed and are negative. Objective:   Physical Exam   Constitutional: She is oriented to person, place, and time. She appears well-developed and well-nourished. No distress. HENT:   Head: Normocephalic and atraumatic. Right Ear: External ear normal.   Left Ear: External ear normal.   Nose: Nose normal.   Mouth/Throat: Oropharynx is clear and moist.   Eyes: Conjunctivae are normal. No scleral icterus. Neck: Normal range of motion. Neck supple. Cardiovascular: Normal rate, regular rhythm, normal heart sounds and intact distal pulses.    Pulmonary/Chest: Effort normal and breath sounds normal. No respiratory distress. She has no wheezes. Abdominal: Soft. She exhibits no distension. There is no tenderness. Musculoskeletal: Normal range of motion. She exhibits no edema. Neurological: She is alert and oriented to person, place, and time. Skin: Skin is warm and dry. She is not diaphoretic. No erythema. Psychiatric: She has a normal mood and affect. Her behavior is normal. Judgment and thought content normal.   Vitals reviewed. Assessment:       Diagnosis Orders   1. Symptomatic cholelithiasis     2. Neutrophilic leukocytosis  [facility] - Yenifer Mota MD, Oncology, [facility]:      She certainly has symptoms consistent with gallbladder disease and would recommend lap cholecystectomy with cholangiogram, possible open  However she has chronic leukocytosis and thrombocythemia. With her possible liver masses on US will refer to hematology prior to cholecystectomy.   No signs of obvious malignancy and CT without visible masses  Discussed low fat diet in the interim  Await hematology workup        Marcos Darnell MD

## 2019-06-21 ENCOUNTER — TELEPHONE (OUTPATIENT)
Dept: SURGERY | Age: 44
End: 2019-06-21

## 2019-06-26 ENCOUNTER — TELEPHONE (OUTPATIENT)
Dept: SURGERY | Age: 44
End: 2019-06-26

## 2019-06-26 NOTE — TELEPHONE ENCOUNTER
Spoke with patient regarding scheduled surgery on 07- with Dr. Mary Wagner. Patient is asked to arrive by 7:30 AM @ [facility] after midnight. May take any  Blood Pressure medication with a small sip of water to wash them down. You will need someone to drive you home following this procedure, and to stay with you for the remainder of the day, due to the anesthesia. Please bring a Photo ID & Insurance card with you, and check-in at the Surgery Desk down the right-hand hallway on the first floor. Surgery is scheduled to start at approx. 9:00 AM and should take approx. 60 minutes. If the hospital needs any further information, someone will give you a call. Patient expressed a verbal understanding of these instructions and had no further questions at this time. Mailed instruction letter. Call ended.

## 2019-07-01 ENCOUNTER — ANESTHESIA (OUTPATIENT)
Dept: ENDOSCOPY | Age: 44
DRG: 419 | End: 2019-07-01
Payer: COMMERCIAL

## 2019-07-01 ENCOUNTER — ANESTHESIA EVENT (OUTPATIENT)
Dept: ENDOSCOPY | Age: 44
DRG: 419 | End: 2019-07-01
Payer: COMMERCIAL

## 2019-07-01 ENCOUNTER — HOSPITAL ENCOUNTER (INPATIENT)
Age: 44
LOS: 5 days | Discharge: HOME | DRG: 419 | End: 2019-07-06
Attending: EMERGENCY MEDICINE | Admitting: SURGERY
Payer: COMMERCIAL

## 2019-07-01 ENCOUNTER — APPOINTMENT (OUTPATIENT)
Dept: CT IMAGING | Age: 44
DRG: 419 | End: 2019-07-01
Payer: COMMERCIAL

## 2019-07-01 ENCOUNTER — APPOINTMENT (OUTPATIENT)
Dept: GENERAL RADIOLOGY | Age: 44
DRG: 419 | End: 2019-07-01
Payer: COMMERCIAL

## 2019-07-01 VITALS
SYSTOLIC BLOOD PRESSURE: 146 MMHG | DIASTOLIC BLOOD PRESSURE: 100 MMHG | RESPIRATION RATE: 7 BRPM | OXYGEN SATURATION: 100 %

## 2019-07-01 DIAGNOSIS — K80.43 CALCULUS OF BILE DUCT WITH ACUTE CHOLECYSTITIS AND OBSTRUCTION: Primary | ICD-10-CM

## 2019-07-01 DIAGNOSIS — K80.20 SYMPTOMATIC CHOLELITHIASIS: ICD-10-CM

## 2019-07-01 DIAGNOSIS — D72.829 LEUKOCYTOSIS, UNSPECIFIED TYPE: ICD-10-CM

## 2019-07-01 DIAGNOSIS — K80.40 CHOLEDOCHOLITHIASIS WITH CHOLECYSTITIS: ICD-10-CM

## 2019-07-01 PROBLEM — K81.0 ACUTE CHOLECYSTITIS: Status: ACTIVE | Noted: 2019-07-01

## 2019-07-01 LAB
A/G RATIO: 1.1 (ref 1.1–2.2)
ALBUMIN SERPL-MCNC: 4.7 G/DL (ref 3.4–5)
ALP BLD-CCNC: 166 U/L (ref 40–129)
ALT SERPL-CCNC: 87 U/L (ref 10–40)
ANION GAP SERPL CALCULATED.3IONS-SCNC: 19 MMOL/L (ref 3–16)
AST SERPL-CCNC: 132 U/L (ref 15–37)
BASOPHILS ABSOLUTE: 0.1 K/UL (ref 0–0.2)
BASOPHILS RELATIVE PERCENT: 0.5 %
BILIRUB SERPL-MCNC: 1.6 MG/DL (ref 0–1)
BUN BLDV-MCNC: 12 MG/DL (ref 7–20)
CALCIUM SERPL-MCNC: 10.6 MG/DL (ref 8.3–10.6)
CHLORIDE BLD-SCNC: 102 MMOL/L (ref 99–110)
CO2: 20 MMOL/L (ref 21–32)
CREAT SERPL-MCNC: 0.6 MG/DL (ref 0.6–1.1)
EOSINOPHILS ABSOLUTE: 0 K/UL (ref 0–0.6)
EOSINOPHILS RELATIVE PERCENT: 0.1 %
GFR AFRICAN AMERICAN: >60
GFR NON-AFRICAN AMERICAN: >60
GLOBULIN: 4.3 G/DL
GLUCOSE BLD-MCNC: 173 MG/DL (ref 70–99)
GLUCOSE BLD-MCNC: 211 MG/DL (ref 70–99)
GLUCOSE BLD-MCNC: 261 MG/DL (ref 70–99)
HCG QUALITATIVE: NEGATIVE
HCG(URINE) PREGNANCY TEST: NEGATIVE
HCT VFR BLD CALC: 41.9 % (ref 36–48)
HEMOGLOBIN: 13.7 G/DL (ref 12–16)
LACTIC ACID: 1.1 MMOL/L (ref 0.4–2)
LIPASE: 17 U/L (ref 13–60)
LYMPHOCYTES ABSOLUTE: 1.6 K/UL (ref 1–5.1)
LYMPHOCYTES RELATIVE PERCENT: 9.9 %
MCH RBC QN AUTO: 26.8 PG (ref 26–34)
MCHC RBC AUTO-ENTMCNC: 32.6 G/DL (ref 31–36)
MCV RBC AUTO: 82.3 FL (ref 80–100)
MONOCYTES ABSOLUTE: 0.6 K/UL (ref 0–1.3)
MONOCYTES RELATIVE PERCENT: 3.8 %
NEUTROPHILS ABSOLUTE: 14.2 K/UL (ref 1.7–7.7)
NEUTROPHILS RELATIVE PERCENT: 85.7 %
PDW BLD-RTO: 13.9 % (ref 12.4–15.4)
PERFORMED ON: ABNORMAL
PERFORMED ON: ABNORMAL
PLATELET # BLD: 603 K/UL (ref 135–450)
PMV BLD AUTO: 8.1 FL (ref 5–10.5)
POTASSIUM REFLEX MAGNESIUM: 4.2 MMOL/L (ref 3.5–5.1)
RBC # BLD: 5.1 M/UL (ref 4–5.2)
SODIUM BLD-SCNC: 141 MMOL/L (ref 136–145)
TOTAL PROTEIN: 9 G/DL (ref 6.4–8.2)
WBC # BLD: 16.5 K/UL (ref 4–11)

## 2019-07-01 PROCEDURE — 85025 COMPLETE CBC W/AUTO DIFF WBC: CPT

## 2019-07-01 PROCEDURE — 6360000002 HC RX W HCPCS: Performed by: SURGERY

## 2019-07-01 PROCEDURE — 2720000010 HC SURG SUPPLY STERILE: Performed by: INTERNAL MEDICINE

## 2019-07-01 PROCEDURE — 2580000003 HC RX 258: Performed by: SURGERY

## 2019-07-01 PROCEDURE — 3700000001 HC ADD 15 MINUTES (ANESTHESIA): Performed by: INTERNAL MEDICINE

## 2019-07-01 PROCEDURE — 99223 1ST HOSP IP/OBS HIGH 75: CPT | Performed by: SURGERY

## 2019-07-01 PROCEDURE — 6360000002 HC RX W HCPCS: Performed by: INTERNAL MEDICINE

## 2019-07-01 PROCEDURE — 3700000000 HC ANESTHESIA ATTENDED CARE: Performed by: INTERNAL MEDICINE

## 2019-07-01 PROCEDURE — 83690 ASSAY OF LIPASE: CPT

## 2019-07-01 PROCEDURE — 6360000004 HC RX CONTRAST MEDICATION: Performed by: EMERGENCY MEDICINE

## 2019-07-01 PROCEDURE — 74177 CT ABD & PELVIS W/CONTRAST: CPT

## 2019-07-01 PROCEDURE — 36415 COLL VENOUS BLD VENIPUNCTURE: CPT

## 2019-07-01 PROCEDURE — 1200000000 HC SEMI PRIVATE

## 2019-07-01 PROCEDURE — 6360000004 HC RX CONTRAST MEDICATION: Performed by: INTERNAL MEDICINE

## 2019-07-01 PROCEDURE — 2709999900 HC NON-CHARGEABLE SUPPLY: Performed by: INTERNAL MEDICINE

## 2019-07-01 PROCEDURE — C2617 STENT, NON-COR, TEM W/O DEL: HCPCS | Performed by: INTERNAL MEDICINE

## 2019-07-01 PROCEDURE — 2580000003 HC RX 258: Performed by: NURSE ANESTHETIST, CERTIFIED REGISTERED

## 2019-07-01 PROCEDURE — 7100000000 HC PACU RECOVERY - FIRST 15 MIN: Performed by: INTERNAL MEDICINE

## 2019-07-01 PROCEDURE — C1769 GUIDE WIRE: HCPCS | Performed by: INTERNAL MEDICINE

## 2019-07-01 PROCEDURE — 74330 X-RAY BILE/PANC ENDOSCOPY: CPT

## 2019-07-01 PROCEDURE — 6360000002 HC RX W HCPCS: Performed by: EMERGENCY MEDICINE

## 2019-07-01 PROCEDURE — 99285 EMERGENCY DEPT VISIT HI MDM: CPT

## 2019-07-01 PROCEDURE — 96376 TX/PRO/DX INJ SAME DRUG ADON: CPT

## 2019-07-01 PROCEDURE — 80053 COMPREHEN METABOLIC PANEL: CPT

## 2019-07-01 PROCEDURE — APPSS180 APP SPLIT SHARED TIME > 60 MINUTES: Performed by: PHYSICIAN ASSISTANT

## 2019-07-01 PROCEDURE — 2580000003 HC RX 258: Performed by: INTERNAL MEDICINE

## 2019-07-01 PROCEDURE — 0F768DZ DILATION OF LEFT HEPATIC DUCT WITH INTRALUMINAL DEVICE, VIA NATURAL OR ARTIFICIAL OPENING ENDOSCOPIC: ICD-10-PCS | Performed by: INTERNAL MEDICINE

## 2019-07-01 PROCEDURE — 6360000002 HC RX W HCPCS: Performed by: NURSE ANESTHETIST, CERTIFIED REGISTERED

## 2019-07-01 PROCEDURE — 3609014900 HC ERCP W/SPHINCTEROTOMY &/OR PAPILLOTOMY: Performed by: INTERNAL MEDICINE

## 2019-07-01 PROCEDURE — 2500000003 HC RX 250 WO HCPCS: Performed by: NURSE ANESTHETIST, CERTIFIED REGISTERED

## 2019-07-01 PROCEDURE — 96375 TX/PRO/DX INJ NEW DRUG ADDON: CPT

## 2019-07-01 PROCEDURE — 84703 CHORIONIC GONADOTROPIN ASSAY: CPT

## 2019-07-01 PROCEDURE — 6370000000 HC RX 637 (ALT 250 FOR IP): Performed by: INTERNAL MEDICINE

## 2019-07-01 PROCEDURE — 3609015100 HC ERCP STENT PLACEMENT BILIARY/PANCREATIC DUCT: Performed by: INTERNAL MEDICINE

## 2019-07-01 PROCEDURE — 3609015200 HC ERCP REMOVE CALCULI/DEBRIS BILIARY/PANCREAS DUCT: Performed by: INTERNAL MEDICINE

## 2019-07-01 PROCEDURE — 3609014600 HC ERCP DESTRUCTION/LITHOTRIPSY CALCULI ANY METHOD: Performed by: INTERNAL MEDICINE

## 2019-07-01 PROCEDURE — 83605 ASSAY OF LACTIC ACID: CPT

## 2019-07-01 PROCEDURE — APPNB180 APP NON BILLABLE TIME > 60 MINS: Performed by: PHYSICIAN ASSISTANT

## 2019-07-01 PROCEDURE — 96374 THER/PROPH/DIAG INJ IV PUSH: CPT

## 2019-07-01 PROCEDURE — 0FC98ZZ EXTIRPATION OF MATTER FROM COMMON BILE DUCT, VIA NATURAL OR ARTIFICIAL OPENING ENDOSCOPIC: ICD-10-PCS | Performed by: INTERNAL MEDICINE

## 2019-07-01 DEVICE — PANCREATIC STENT
Type: IMPLANTABLE DEVICE | Site: PANCREAS | Status: FUNCTIONAL
Brand: ADVANIX™ PANCREATIC STENT

## 2019-07-01 RX ORDER — MORPHINE SULFATE 2 MG/ML
2 INJECTION, SOLUTION INTRAMUSCULAR; INTRAVENOUS
Status: DISCONTINUED | OUTPATIENT
Start: 2019-07-01 | End: 2019-07-06 | Stop reason: HOSPADM

## 2019-07-01 RX ORDER — BUPROPION HYDROCHLORIDE 100 MG/1
200 TABLET, EXTENDED RELEASE ORAL DAILY
Status: DISCONTINUED | OUTPATIENT
Start: 2019-07-02 | End: 2019-07-06 | Stop reason: HOSPADM

## 2019-07-01 RX ORDER — OXYCODONE HYDROCHLORIDE AND ACETAMINOPHEN 5; 325 MG/1; MG/1
2 TABLET ORAL PRN
Status: DISCONTINUED | OUTPATIENT
Start: 2019-07-01 | End: 2019-07-01

## 2019-07-01 RX ORDER — OXYCODONE HYDROCHLORIDE AND ACETAMINOPHEN 5; 325 MG/1; MG/1
2 TABLET ORAL EVERY 4 HOURS PRN
Status: DISCONTINUED | OUTPATIENT
Start: 2019-07-01 | End: 2019-07-06 | Stop reason: HOSPADM

## 2019-07-01 RX ORDER — CIPROFLOXACIN 2 MG/ML
400 INJECTION, SOLUTION INTRAVENOUS EVERY 12 HOURS
Status: DISCONTINUED | OUTPATIENT
Start: 2019-07-01 | End: 2019-07-06 | Stop reason: HOSPADM

## 2019-07-01 RX ORDER — FENTANYL CITRATE 50 UG/ML
25 INJECTION, SOLUTION INTRAMUSCULAR; INTRAVENOUS EVERY 5 MIN PRN
Status: DISCONTINUED | OUTPATIENT
Start: 2019-07-01 | End: 2019-07-01

## 2019-07-01 RX ORDER — SODIUM CHLORIDE 9 MG/ML
INJECTION, SOLUTION INTRAVENOUS CONTINUOUS PRN
Status: DISCONTINUED | OUTPATIENT
Start: 2019-07-01 | End: 2019-07-01 | Stop reason: SDUPTHER

## 2019-07-01 RX ORDER — SODIUM CHLORIDE 0.9 % (FLUSH) 0.9 %
10 SYRINGE (ML) INJECTION EVERY 12 HOURS SCHEDULED
Status: DISCONTINUED | OUTPATIENT
Start: 2019-07-01 | End: 2019-07-06 | Stop reason: HOSPADM

## 2019-07-01 RX ORDER — ACETAMINOPHEN 325 MG/1
650 TABLET ORAL EVERY 4 HOURS PRN
Status: DISCONTINUED | OUTPATIENT
Start: 2019-07-01 | End: 2019-07-06 | Stop reason: HOSPADM

## 2019-07-01 RX ORDER — ONDANSETRON 2 MG/ML
4 INJECTION INTRAMUSCULAR; INTRAVENOUS
Status: DISCONTINUED | OUTPATIENT
Start: 2019-07-01 | End: 2019-07-01

## 2019-07-01 RX ORDER — ONDANSETRON 2 MG/ML
4 INJECTION INTRAMUSCULAR; INTRAVENOUS EVERY 6 HOURS PRN
Status: DISCONTINUED | OUTPATIENT
Start: 2019-07-01 | End: 2019-07-06 | Stop reason: HOSPADM

## 2019-07-01 RX ORDER — SUCCINYLCHOLINE/SOD CL,ISO/PF 200MG/10ML
SYRINGE (ML) INTRAVENOUS PRN
Status: DISCONTINUED | OUTPATIENT
Start: 2019-07-01 | End: 2019-07-01 | Stop reason: SDUPTHER

## 2019-07-01 RX ORDER — MORPHINE SULFATE 4 MG/ML
4 INJECTION, SOLUTION INTRAMUSCULAR; INTRAVENOUS
Status: DISCONTINUED | OUTPATIENT
Start: 2019-07-01 | End: 2019-07-06 | Stop reason: HOSPADM

## 2019-07-01 RX ORDER — SODIUM CHLORIDE 0.9 % (FLUSH) 0.9 %
10 SYRINGE (ML) INJECTION PRN
Status: DISCONTINUED | OUTPATIENT
Start: 2019-07-01 | End: 2019-07-06 | Stop reason: HOSPADM

## 2019-07-01 RX ORDER — SODIUM CHLORIDE 9 MG/ML
INJECTION, SOLUTION INTRAVENOUS CONTINUOUS
Status: DISCONTINUED | OUTPATIENT
Start: 2019-07-01 | End: 2019-07-04

## 2019-07-01 RX ORDER — LISINOPRIL 5 MG/1
2.5 TABLET ORAL DAILY
Status: DISCONTINUED | OUTPATIENT
Start: 2019-07-02 | End: 2019-07-06 | Stop reason: HOSPADM

## 2019-07-01 RX ORDER — OXYCODONE HYDROCHLORIDE AND ACETAMINOPHEN 5; 325 MG/1; MG/1
1 TABLET ORAL PRN
Status: DISCONTINUED | OUTPATIENT
Start: 2019-07-01 | End: 2019-07-01

## 2019-07-01 RX ORDER — ONDANSETRON 2 MG/ML
4 INJECTION INTRAMUSCULAR; INTRAVENOUS ONCE
Status: COMPLETED | OUTPATIENT
Start: 2019-07-01 | End: 2019-07-01

## 2019-07-01 RX ORDER — MORPHINE SULFATE 2 MG/ML
4 INJECTION, SOLUTION INTRAMUSCULAR; INTRAVENOUS ONCE
Status: COMPLETED | OUTPATIENT
Start: 2019-07-01 | End: 2019-07-01

## 2019-07-01 RX ORDER — OXYCODONE HYDROCHLORIDE AND ACETAMINOPHEN 5; 325 MG/1; MG/1
1 TABLET ORAL EVERY 4 HOURS PRN
Status: DISCONTINUED | OUTPATIENT
Start: 2019-07-01 | End: 2019-07-06 | Stop reason: HOSPADM

## 2019-07-01 RX ORDER — OYSTER SHELL CALCIUM WITH VITAMIN D 500; 200 MG/1; [IU]/1
1 TABLET, FILM COATED ORAL DAILY
COMMUNITY

## 2019-07-01 RX ORDER — MIDAZOLAM HYDROCHLORIDE 1 MG/ML
INJECTION INTRAMUSCULAR; INTRAVENOUS PRN
Status: DISCONTINUED | OUTPATIENT
Start: 2019-07-01 | End: 2019-07-01 | Stop reason: SDUPTHER

## 2019-07-01 RX ORDER — FENTANYL CITRATE 50 UG/ML
INJECTION, SOLUTION INTRAMUSCULAR; INTRAVENOUS PRN
Status: DISCONTINUED | OUTPATIENT
Start: 2019-07-01 | End: 2019-07-01 | Stop reason: SDUPTHER

## 2019-07-01 RX ORDER — PROPOFOL 10 MG/ML
INJECTION, EMULSION INTRAVENOUS PRN
Status: DISCONTINUED | OUTPATIENT
Start: 2019-07-01 | End: 2019-07-01 | Stop reason: SDUPTHER

## 2019-07-01 RX ADMIN — SODIUM CHLORIDE, PRESERVATIVE FREE 10 ML: 5 INJECTION INTRAVENOUS at 20:17

## 2019-07-01 RX ADMIN — HYDROMORPHONE HYDROCHLORIDE 0.5 MG: 1 INJECTION, SOLUTION INTRAMUSCULAR; INTRAVENOUS; SUBCUTANEOUS at 11:35

## 2019-07-01 RX ADMIN — CIPROFLOXACIN 400 MG: 2 INJECTION, SOLUTION INTRAVENOUS at 23:17

## 2019-07-01 RX ADMIN — Medication 100 MG: at 16:23

## 2019-07-01 RX ADMIN — ONDANSETRON 4 MG: 2 INJECTION INTRAMUSCULAR; INTRAVENOUS at 23:17

## 2019-07-01 RX ADMIN — MORPHINE SULFATE 4 MG: 2 INJECTION, SOLUTION INTRAMUSCULAR; INTRAVENOUS at 09:00

## 2019-07-01 RX ADMIN — MIDAZOLAM 2 MG: 1 INJECTION INTRAMUSCULAR; INTRAVENOUS at 16:23

## 2019-07-01 RX ADMIN — INDOMETHACIN 100 MG: 50 SUPPOSITORY RECTAL at 16:46

## 2019-07-01 RX ADMIN — CIPROFLOXACIN 400 MG: 2 INJECTION, SOLUTION INTRAVENOUS at 12:23

## 2019-07-01 RX ADMIN — SODIUM CHLORIDE: 9 INJECTION, SOLUTION INTRAVENOUS at 12:23

## 2019-07-01 RX ADMIN — MORPHINE SULFATE 4 MG: 2 INJECTION, SOLUTION INTRAMUSCULAR; INTRAVENOUS at 09:59

## 2019-07-01 RX ADMIN — PROPOFOL 160 MG: 10 INJECTION, EMULSION INTRAVENOUS at 16:23

## 2019-07-01 RX ADMIN — SODIUM CHLORIDE: 9 INJECTION, SOLUTION INTRAVENOUS at 17:26

## 2019-07-01 RX ADMIN — SODIUM CHLORIDE: 9 INJECTION, SOLUTION INTRAVENOUS at 16:03

## 2019-07-01 RX ADMIN — IOPAMIDOL 75 ML: 755 INJECTION, SOLUTION INTRAVENOUS at 10:54

## 2019-07-01 RX ADMIN — ONDANSETRON 4 MG: 2 INJECTION INTRAMUSCULAR; INTRAVENOUS at 09:00

## 2019-07-01 RX ADMIN — MORPHINE SULFATE 4 MG: 4 INJECTION INTRAVENOUS at 12:40

## 2019-07-01 RX ADMIN — ONDANSETRON 4 MG: 2 INJECTION INTRAMUSCULAR; INTRAVENOUS at 18:31

## 2019-07-01 RX ADMIN — FENTANYL CITRATE 100 MCG: 50 INJECTION INTRAMUSCULAR; INTRAVENOUS at 16:23

## 2019-07-01 ASSESSMENT — PULMONARY FUNCTION TESTS
PIF_VALUE: 25
PIF_VALUE: 27
PIF_VALUE: 21
PIF_VALUE: 23
PIF_VALUE: 25
PIF_VALUE: 25
PIF_VALUE: 20
PIF_VALUE: 24
PIF_VALUE: 25
PIF_VALUE: 7
PIF_VALUE: 25
PIF_VALUE: 27
PIF_VALUE: 5
PIF_VALUE: 22
PIF_VALUE: 25
PIF_VALUE: 4
PIF_VALUE: 22
PIF_VALUE: 26
PIF_VALUE: 7
PIF_VALUE: 23
PIF_VALUE: 21
PIF_VALUE: 23
PIF_VALUE: 4
PIF_VALUE: 20
PIF_VALUE: 24
PIF_VALUE: 23
PIF_VALUE: 21
PIF_VALUE: 27
PIF_VALUE: 27
PIF_VALUE: 25
PIF_VALUE: 24
PIF_VALUE: 27
PIF_VALUE: 3
PIF_VALUE: 24
PIF_VALUE: 14
PIF_VALUE: 19
PIF_VALUE: 26
PIF_VALUE: 7
PIF_VALUE: 24
PIF_VALUE: 27
PIF_VALUE: 26
PIF_VALUE: 21
PIF_VALUE: 25
PIF_VALUE: 23
PIF_VALUE: 6
PIF_VALUE: 25
PIF_VALUE: 26
PIF_VALUE: 27
PIF_VALUE: 25
PIF_VALUE: 26
PIF_VALUE: 20
PIF_VALUE: 25
PIF_VALUE: 1
PIF_VALUE: 0
PIF_VALUE: 0
PIF_VALUE: 5
PIF_VALUE: 20
PIF_VALUE: 23
PIF_VALUE: 28
PIF_VALUE: 26
PIF_VALUE: 25
PIF_VALUE: 25
PIF_VALUE: 27
PIF_VALUE: 7
PIF_VALUE: 20
PIF_VALUE: 23
PIF_VALUE: 23
PIF_VALUE: 26
PIF_VALUE: 25
PIF_VALUE: 26
PIF_VALUE: 27
PIF_VALUE: 23
PIF_VALUE: 22

## 2019-07-01 ASSESSMENT — PAIN SCALES - GENERAL
PAINLEVEL_OUTOF10: 10
PAINLEVEL_OUTOF10: 10
PAINLEVEL_OUTOF10: 0
PAINLEVEL_OUTOF10: 10
PAINLEVEL_OUTOF10: 7
PAINLEVEL_OUTOF10: 10
PAINLEVEL_OUTOF10: 0
PAINLEVEL_OUTOF10: 6
PAINLEVEL_OUTOF10: 0
PAINLEVEL_OUTOF10: 8
PAINLEVEL_OUTOF10: 7
PAINLEVEL_OUTOF10: 0

## 2019-07-01 ASSESSMENT — PAIN DESCRIPTION - ONSET: ONSET: ON-GOING

## 2019-07-01 ASSESSMENT — PAIN DESCRIPTION - PAIN TYPE
TYPE: ACUTE PAIN

## 2019-07-01 ASSESSMENT — PAIN DESCRIPTION - FREQUENCY: FREQUENCY: CONTINUOUS

## 2019-07-01 ASSESSMENT — LIFESTYLE VARIABLES: SMOKING_STATUS: 0

## 2019-07-01 ASSESSMENT — PAIN - FUNCTIONAL ASSESSMENT: PAIN_FUNCTIONAL_ASSESSMENT: 0-10

## 2019-07-01 ASSESSMENT — PAIN DESCRIPTION - DESCRIPTORS
DESCRIPTORS: CONSTANT;CRAMPING;DULL
DESCRIPTORS: CRAMPING;CONSTANT

## 2019-07-01 ASSESSMENT — PAIN DESCRIPTION - LOCATION
LOCATION: ABDOMEN

## 2019-07-01 ASSESSMENT — PAIN DESCRIPTION - PROGRESSION: CLINICAL_PROGRESSION: NOT CHANGED

## 2019-07-01 ASSESSMENT — PAIN DESCRIPTION - ORIENTATION
ORIENTATION: RIGHT
ORIENTATION: RIGHT

## 2019-07-01 ASSESSMENT — ENCOUNTER SYMPTOMS: SHORTNESS OF BREATH: 0

## 2019-07-01 NOTE — ED NOTES
ED SBAR report provider to [facility]. Patient to be transported to Room 5281 via stretcher by ED tech. Patient transported with bedside cardiac monitor and with IV medications infusing. IV site clean, dry, and intact. MEWS score and pain assessed and documented. Updated patient on plan of care.        Zhao Cunningham RN  07/01/19 [id number]

## 2019-07-01 NOTE — ANESTHESIA PRE PROCEDURE
2.5 mg Oral Daily Riley Hilario MD        sodium chloride flush 0.9 % injection 10 mL  10 mL Intravenous 2 times per day Riley Hilario MD        sodium chloride flush 0.9 % injection 10 mL  10 mL Intravenous PRN Riley Hilario MD        acetaminophen (TYLENOL) tablet 650 mg  650 mg Oral Q4H PRN Riley Hilario MD        ondansetron [facility]) injection 4 mg  4 mg Intravenous Q6H PRN Riley Hilario MD        enoxaparin (LOVENOX) injection 40 mg  40 mg Subcutaneous Daily Riley Hilario MD        0.9 % sodium chloride infusion   Intravenous Continuous Riley Hilario  mL/hr at 19 1223      ciprofloxacin (CIPRO) IVPB 400 mg  400 mg Intravenous Q12H Riley Hilario MD   Stopped at 19 1325    morphine (PF) injection 2 mg  2 mg Intravenous Q2H PRN Riley Hilario MD        Or    morphine (PF) injection 4 mg  4 mg Intravenous Q2H PRN Riley Hilario MD   4 mg at 19 1240    oxyCODONE-acetaminophen (PERCOCET) 5-325 MG per tablet 1 tablet  1 tablet Oral Q4H PRN Riley Hilario MD        Or    oxyCODONE-acetaminophen (PERCOCET) 5-325 MG per tablet 2 tablet  2 tablet Oral Q4H PRN Riley Hilario MD           Allergies:  No Known Allergies    Problem List:    Patient Active Problem List   Diagnosis Code    Symptomatic cholelithiasis J33.41    Neutrophilic leukocytosis L82.7    Acute cholecystitis K81.0    Choledocholithiasis with cholecystitis K80.40       Past Medical History:        Diagnosis Date    Anxiety     Depression     Diabetes mellitus ([facility] [address]        Past Surgical History:        Procedure Laterality Date     SECTION      CYST REMOVAL      removal to bilateral eyes        Social History:    Social History     Tobacco Use    Smoking status: Former Smoker     Start date: 4/10/1989     Last attempt to quit: 2010     Years since quittin.5    Smokeless tobacco: Never Used   Substance Use Topics    Alcohol use: Not Currently     Frequency: 2-3 times a week     Comment:

## 2019-07-01 NOTE — ED TRIAGE NOTES
Patient A&Ox4 to ED for right upper quadrant pain. Patient states she is scheduled for Gallbladder surgery on 7/8/19 with Dr. Nicky Adames but cannot control her pain at home. Patient is diaphoretic and hunched over in pain at this time. Patient is tachycardic and hypertensive. Patient states she is not normally hypertensive, but thinks it is the pain. 100 on RA. No acute distress noted at this time. Respirations easy and unlabored.

## 2019-07-01 NOTE — OP NOTE
the pancreas. Next, the 0.025\" Faiza Lefort was passed gently and easily along the course of the pancreatic duct by the endoscopist.  The wire was used to guide biliary cannulation and then a 4Fr 3 cm single pigtail pancreatic stent was placed. This was lost during biliary stone removal.  Cholangiogram:  The biliary orifice was then selectively cannulated with a Jagtome using the pancreatic wire as a guide. Contrast was injected and revealed a 1cm obstructing filling defect in the bile duct. Next, a 0.025\" Faiza Lefort was passed without resistance into the bile duct and using the wire as a guide and using blended cautery, a 1cm biliary sphincterotomy was performed to the 12:00 position without complications. The stone was grasped with a trapezoid basket but was not able to be pulled through the papilla. Mechanical lithotripsy attempted but the sheath pulled off and the basket remained around the stone. At this point, the scope was removed and the Soehendra lithotriptor was passed over basket wires and mechanical lithotripsy performed. Next, further mechanical lithotripsy performed with a new trapezoid basket. At this multiple stone fragments were able to be removed with a basket and balloon extractions. There was apparent complete clearance of the duct but there were air bubbles in the duct. Also, small purulent material was draining. As a result, a 7Fr 5 cm double pigtail biliary stent was placed with proximal aspect in the left hepatic duct and distal aspect pigtailed in the duodenum. The cannulas were then withdrawn. The duodenum and stomach were decompressed and the scope was withdrawn from the patient. The patient tolerated the procedure well and was taken to the post anesthesia care unit in good condition.     Radiological Interpretation:  All fluoroscopic images and still x-ray films were carefullly examined and interpreted by the endoscopist on the spot during the procedure in the absence of radiologist.  These interpretations guided the course of the procedure and the interventions mentioned above and below. Estimated blood loss: minimal  Specimens taken: none      Impression:  1. Choledocolithiasis s/p biliary sphincterotomy and mechanical lithotripsy and stone extraction   2. Cholangitis with small pus in the bile duct. A 7 Fr 5 cm double pigtail biliary stent was placed. 3.  I placed a pancreatic stent prophylacticaly but this fell out during biliary work. Indomethacin 100mg in suppositories given. Recommendations:  1. Clear diet. If does well overnight can advance diet as tolerated tomorrow. 2.  Repeat ERCP in 4 weeks to assure no residual stones in the bile duct  3. GI consult service to follow.     Kyle Cardenas  7/1/2019

## 2019-07-01 NOTE — H&P
Denies any fever or chills  HEENT Denies any diplopia, tinnitus or vertigo  Resp Denies any shortness of breath, cough or wheezing  Cardiac Denies any chest pain, palpitations, claudication or edema  GI Denies any melena, hematochezia, hematemesis or pyrosis   Denies any frequency, urgency, hesitancy or incontinence  Heme Denies bruising or bleeding easily  Neuro Denies any focal motor or sensory deficits  OBJECTIVE:   VITALS:  height is 5' 1\" (1.549 m) and weight is 171 lb 1.2 oz (77.6 kg). Her oral temperature is 97.7 °F (36.5 °C). Her blood pressure is 192/108 (abnormal) and her pulse is 108. Her respiration is 16 and oxygen saturation is 96%. CONSTITUTIONAL: Alert and oriented times 3, no acute distress and cooperative to examination with proper mood and affect. SKIN: Skin color, texture, turgor normal. No rashes or lesions. LYMPH: no cervical nodes, no inguinal nodes  HEENT: Head is normocephalic, atraumatic. EOMI, PERRLA. NECK: Supple, symmetrical, trachea midline, no adenopathy, thyroid symmetric, not enlarged and no tenderness, skin normal.  CHEST/LUNGS: chest symmetric with normal A/P diameter, normal respiratory rate and rhythm  CARDIOVASCULAR: Tachycardic   ABDOMEN: Normal shape. Tenderness:epigastric and RUQ  RECTAL: deferred, not clinically indicated  NEUROLOGIC: There are no focalizing motor or sensory deficits. CN II-XII are grossly intact. Betha Lute EXTREMITIES: no cyanosis, no clubbing and no edema.   LABS:     Recent Labs     07/01/19  0907   WBC 16.5*   HGB 13.7   HCT 41.9   *      K 4.2      CO2 20*   BUN 12   CREATININE 0.6   CALCIUM 10.6   *   ALT 87*   BILITOT 1.6*     Recent Labs     07/01/19  0907   ALKPHOS 166*   ALT 87*   *   BILITOT 1.6*   LABALBU 4.7   LIPASE 17.0     CBC:   Lab Results   Component Value Date    WBC 16.5 07/01/2019    RBC 5.10 07/01/2019    HGB 13.7 07/01/2019    HCT 41.9 07/01/2019    MCV 82.3 07/01/2019    MCH 26.8 07/01/2019    [facility]    RDW 13.9 07/01/2019     07/01/2019    MPV 8.1 07/01/2019     CMP:    Lab Results   Component Value Date     07/01/2019    K 4.2 07/01/2019     07/01/2019    CO2 20 07/01/2019    BUN 12 07/01/2019    CREATININE 0.6 07/01/2019    GFRAA >60 07/01/2019    AGRATIO 1.1 07/01/2019    LABGLOM >60 07/01/2019    GLUCOSE 261 07/01/2019    PROT 9.0 07/01/2019    LABALBU 4.7 07/01/2019    CALCIUM 10.6 07/01/2019    BILITOT 1.6 07/01/2019    ALKPHOS 166 07/01/2019     07/01/2019    ALT 87 07/01/2019     Urine Culture:  No components found for: CURINE  Blood Culture:  No components found for: CBLOOD, CFUNGUSBL  RADIOLOGY:     CT scan abd/pelvis: Suggestive of acute cholecystitis and obstructive choledocholithiasis. Thank you for the interesting evaluation. Further recommendations to follow. Electronically signed by TESSA Ozuna on 7/1/2019 at 1:47 PM       Surgery Staff  I have examined this patient and read and agree with the note by Juana Scales PA-C from today. Originally scheduled for lap jacob next week  Presented with acute onset RUQ pain    Abdomen tender RUQ  Tbili elevated  CT with cholecystitis and choledocholithiasis    Admit for IV abx  D/W GI.  Plans for ERCP today  OR for lap cholecystectomy tomorrow  The risks, benefits and alternatives to the planned procedure were discussed. Patient expressed an understanding and is willing to proceed.     Electronically signed by Allan Rhoades MD on 7/1/2019 at 4:15 PM

## 2019-07-02 ENCOUNTER — ANESTHESIA EVENT (OUTPATIENT)
Dept: OPERATING ROOM | Age: 44
DRG: 419 | End: 2019-07-02
Payer: COMMERCIAL

## 2019-07-02 ENCOUNTER — ANESTHESIA (OUTPATIENT)
Dept: OPERATING ROOM | Age: 44
DRG: 419 | End: 2019-07-02
Payer: COMMERCIAL

## 2019-07-02 ENCOUNTER — APPOINTMENT (OUTPATIENT)
Dept: GENERAL RADIOLOGY | Age: 44
DRG: 419 | End: 2019-07-02
Payer: COMMERCIAL

## 2019-07-02 VITALS
DIASTOLIC BLOOD PRESSURE: 78 MMHG | RESPIRATION RATE: 16 BRPM | SYSTOLIC BLOOD PRESSURE: 145 MMHG | OXYGEN SATURATION: 97 % | TEMPERATURE: 97.3 F

## 2019-07-02 LAB
ALBUMIN SERPL-MCNC: 3.8 G/DL (ref 3.4–5)
ALP BLD-CCNC: 248 U/L (ref 40–129)
ALT SERPL-CCNC: 203 U/L (ref 10–40)
ANION GAP SERPL CALCULATED.3IONS-SCNC: 14 MMOL/L (ref 3–16)
AST SERPL-CCNC: 193 U/L (ref 15–37)
BASOPHILS ABSOLUTE: 0 K/UL (ref 0–0.2)
BASOPHILS RELATIVE PERCENT: 0.2 %
BILIRUB SERPL-MCNC: 4.1 MG/DL (ref 0–1)
BILIRUBIN DIRECT: 4 MG/DL (ref 0–0.3)
BILIRUBIN, INDIRECT: 0.1 MG/DL (ref 0–1)
BUN BLDV-MCNC: 8 MG/DL (ref 7–20)
CALCIUM SERPL-MCNC: 8.7 MG/DL (ref 8.3–10.6)
CHLORIDE BLD-SCNC: 102 MMOL/L (ref 99–110)
CO2: 25 MMOL/L (ref 21–32)
CREAT SERPL-MCNC: <0.5 MG/DL (ref 0.6–1.1)
EOSINOPHILS ABSOLUTE: 0 K/UL (ref 0–0.6)
EOSINOPHILS RELATIVE PERCENT: 0.2 %
GFR AFRICAN AMERICAN: >60
GFR NON-AFRICAN AMERICAN: >60
GLUCOSE BLD-MCNC: 219 MG/DL (ref 70–99)
GLUCOSE BLD-MCNC: 241 MG/DL (ref 70–99)
GLUCOSE BLD-MCNC: 269 MG/DL (ref 70–99)
HCT VFR BLD CALC: 43.4 % (ref 36–48)
HEMOGLOBIN: 13.9 G/DL (ref 12–16)
LYMPHOCYTES ABSOLUTE: 1.8 K/UL (ref 1–5.1)
LYMPHOCYTES RELATIVE PERCENT: 16.2 %
MAGNESIUM: 2.2 MG/DL (ref 1.8–2.4)
MCH RBC QN AUTO: 26.4 PG (ref 26–34)
MCHC RBC AUTO-ENTMCNC: 32.1 G/DL (ref 31–36)
MCV RBC AUTO: 82.2 FL (ref 80–100)
MONOCYTES ABSOLUTE: 0.6 K/UL (ref 0–1.3)
MONOCYTES RELATIVE PERCENT: 5.7 %
NEUTROPHILS ABSOLUTE: 8.7 K/UL (ref 1.7–7.7)
NEUTROPHILS RELATIVE PERCENT: 77.7 %
PDW BLD-RTO: 14.5 % (ref 12.4–15.4)
PERFORMED ON: ABNORMAL
PERFORMED ON: ABNORMAL
PLATELET # BLD: 563 K/UL (ref 135–450)
PMV BLD AUTO: 8.4 FL (ref 5–10.5)
POTASSIUM REFLEX MAGNESIUM: 3.5 MMOL/L (ref 3.5–5.1)
RBC # BLD: 5.27 M/UL (ref 4–5.2)
SODIUM BLD-SCNC: 141 MMOL/L (ref 136–145)
TOTAL PROTEIN: 7.5 G/DL (ref 6.4–8.2)
WBC # BLD: 11.2 K/UL (ref 4–11)

## 2019-07-02 PROCEDURE — 6360000004 HC RX CONTRAST MEDICATION: Performed by: SURGERY

## 2019-07-02 PROCEDURE — 36415 COLL VENOUS BLD VENIPUNCTURE: CPT

## 2019-07-02 PROCEDURE — 3600000004 HC SURGERY LEVEL 4 BASE: Performed by: SURGERY

## 2019-07-02 PROCEDURE — 47563 LAPARO CHOLECYSTECTOMY/GRAPH: CPT | Performed by: SURGERY

## 2019-07-02 PROCEDURE — 80076 HEPATIC FUNCTION PANEL: CPT

## 2019-07-02 PROCEDURE — 7100000000 HC PACU RECOVERY - FIRST 15 MIN: Performed by: SURGERY

## 2019-07-02 PROCEDURE — 2500000003 HC RX 250 WO HCPCS: Performed by: NURSE ANESTHETIST, CERTIFIED REGISTERED

## 2019-07-02 PROCEDURE — 0FT44ZZ RESECTION OF GALLBLADDER, PERCUTANEOUS ENDOSCOPIC APPROACH: ICD-10-PCS | Performed by: SURGERY

## 2019-07-02 PROCEDURE — 6360000002 HC RX W HCPCS: Performed by: SURGERY

## 2019-07-02 PROCEDURE — 3700000000 HC ANESTHESIA ATTENDED CARE: Performed by: SURGERY

## 2019-07-02 PROCEDURE — 3600000014 HC SURGERY LEVEL 4 ADDTL 15MIN: Performed by: SURGERY

## 2019-07-02 PROCEDURE — 2580000003 HC RX 258: Performed by: SURGERY

## 2019-07-02 PROCEDURE — 80048 BASIC METABOLIC PNL TOTAL CA: CPT

## 2019-07-02 PROCEDURE — 2580000003 HC RX 258: Performed by: NURSE ANESTHETIST, CERTIFIED REGISTERED

## 2019-07-02 PROCEDURE — 3700000001 HC ADD 15 MINUTES (ANESTHESIA): Performed by: SURGERY

## 2019-07-02 PROCEDURE — 2709999900 HC NON-CHARGEABLE SUPPLY: Performed by: SURGERY

## 2019-07-02 PROCEDURE — 94760 N-INVAS EAR/PLS OXIMETRY 1: CPT

## 2019-07-02 PROCEDURE — 2500000003 HC RX 250 WO HCPCS: Performed by: SURGERY

## 2019-07-02 PROCEDURE — 2720000010 HC SURG SUPPLY STERILE: Performed by: SURGERY

## 2019-07-02 PROCEDURE — 6360000002 HC RX W HCPCS: Performed by: NURSE ANESTHETIST, CERTIFIED REGISTERED

## 2019-07-02 PROCEDURE — 7100000001 HC PACU RECOVERY - ADDTL 15 MIN: Performed by: SURGERY

## 2019-07-02 PROCEDURE — 74300 X-RAY BILE DUCTS/PANCREAS: CPT

## 2019-07-02 PROCEDURE — 2500000003 HC RX 250 WO HCPCS

## 2019-07-02 PROCEDURE — 1200000000 HC SEMI PRIVATE

## 2019-07-02 PROCEDURE — 6370000000 HC RX 637 (ALT 250 FOR IP): Performed by: SURGERY

## 2019-07-02 PROCEDURE — 83735 ASSAY OF MAGNESIUM: CPT

## 2019-07-02 PROCEDURE — 6360000002 HC RX W HCPCS: Performed by: INTERNAL MEDICINE

## 2019-07-02 PROCEDURE — BF101ZZ FLUOROSCOPY OF BILE DUCTS USING LOW OSMOLAR CONTRAST: ICD-10-PCS | Performed by: SURGERY

## 2019-07-02 PROCEDURE — 6360000002 HC RX W HCPCS: Performed by: ANESTHESIOLOGY

## 2019-07-02 PROCEDURE — 2580000003 HC RX 258: Performed by: INTERNAL MEDICINE

## 2019-07-02 PROCEDURE — 88304 TISSUE EXAM BY PATHOLOGIST: CPT

## 2019-07-02 PROCEDURE — 6370000000 HC RX 637 (ALT 250 FOR IP): Performed by: INTERNAL MEDICINE

## 2019-07-02 PROCEDURE — 85025 COMPLETE CBC W/AUTO DIFF WBC: CPT

## 2019-07-02 RX ORDER — SUCCINYLCHOLINE/SOD CL,ISO/PF 200MG/10ML
SYRINGE (ML) INTRAVENOUS PRN
Status: DISCONTINUED | OUTPATIENT
Start: 2019-07-02 | End: 2019-07-02 | Stop reason: SDUPTHER

## 2019-07-02 RX ORDER — BUPIVACAINE HYDROCHLORIDE 5 MG/ML
INJECTION, SOLUTION EPIDURAL; INTRACAUDAL
Status: COMPLETED | OUTPATIENT
Start: 2019-07-02 | End: 2019-07-02

## 2019-07-02 RX ORDER — ONDANSETRON 2 MG/ML
INJECTION INTRAMUSCULAR; INTRAVENOUS PRN
Status: DISCONTINUED | OUTPATIENT
Start: 2019-07-02 | End: 2019-07-02 | Stop reason: SDUPTHER

## 2019-07-02 RX ORDER — PROPOFOL 10 MG/ML
INJECTION, EMULSION INTRAVENOUS PRN
Status: DISCONTINUED | OUTPATIENT
Start: 2019-07-02 | End: 2019-07-02 | Stop reason: SDUPTHER

## 2019-07-02 RX ORDER — ROCURONIUM BROMIDE 10 MG/ML
INJECTION, SOLUTION INTRAVENOUS PRN
Status: DISCONTINUED | OUTPATIENT
Start: 2019-07-02 | End: 2019-07-02 | Stop reason: SDUPTHER

## 2019-07-02 RX ORDER — APREPITANT 40 MG/1
40 CAPSULE ORAL ONCE
Status: COMPLETED | OUTPATIENT
Start: 2019-07-02 | End: 2019-07-02

## 2019-07-02 RX ORDER — LIDOCAINE HYDROCHLORIDE 20 MG/ML
INJECTION, SOLUTION EPIDURAL; INFILTRATION; INTRACAUDAL; PERINEURAL PRN
Status: DISCONTINUED | OUTPATIENT
Start: 2019-07-02 | End: 2019-07-02 | Stop reason: SDUPTHER

## 2019-07-02 RX ORDER — MIDAZOLAM HYDROCHLORIDE 1 MG/ML
INJECTION INTRAMUSCULAR; INTRAVENOUS PRN
Status: DISCONTINUED | OUTPATIENT
Start: 2019-07-02 | End: 2019-07-02 | Stop reason: SDUPTHER

## 2019-07-02 RX ORDER — MAGNESIUM HYDROXIDE 1200 MG/15ML
LIQUID ORAL CONTINUOUS PRN
Status: COMPLETED | OUTPATIENT
Start: 2019-07-02 | End: 2019-07-02

## 2019-07-02 RX ORDER — ROCURONIUM BROMIDE 10 MG/ML
INJECTION, SOLUTION INTRAVENOUS PRN
Status: DISCONTINUED | OUTPATIENT
Start: 2019-07-02 | End: 2019-07-02

## 2019-07-02 RX ORDER — FENTANYL CITRATE 50 UG/ML
INJECTION, SOLUTION INTRAMUSCULAR; INTRAVENOUS PRN
Status: DISCONTINUED | OUTPATIENT
Start: 2019-07-02 | End: 2019-07-02 | Stop reason: SDUPTHER

## 2019-07-02 RX ORDER — SODIUM CHLORIDE 9 MG/ML
INJECTION, SOLUTION INTRAVENOUS CONTINUOUS PRN
Status: DISCONTINUED | OUTPATIENT
Start: 2019-07-02 | End: 2019-07-02 | Stop reason: SDUPTHER

## 2019-07-02 RX ADMIN — PHENYLEPHRINE HYDROCHLORIDE 100 MCG: 10 INJECTION INTRAVENOUS at 15:12

## 2019-07-02 RX ADMIN — APREPITANT 40 MG: 40 CAPSULE ORAL at 14:11

## 2019-07-02 RX ADMIN — MORPHINE SULFATE 2 MG: 2 INJECTION, SOLUTION INTRAMUSCULAR; INTRAVENOUS at 08:37

## 2019-07-02 RX ADMIN — SODIUM CHLORIDE: 9 INJECTION, SOLUTION INTRAVENOUS at 14:11

## 2019-07-02 RX ADMIN — PHENYLEPHRINE HYDROCHLORIDE 100 MCG: 10 INJECTION INTRAVENOUS at 15:15

## 2019-07-02 RX ADMIN — PHENYLEPHRINE HYDROCHLORIDE 200 MCG: 10 INJECTION INTRAVENOUS at 14:34

## 2019-07-02 RX ADMIN — PHENYLEPHRINE HYDROCHLORIDE 100 MCG: 10 INJECTION INTRAVENOUS at 14:38

## 2019-07-02 RX ADMIN — FENTANYL CITRATE 50 MCG: 50 INJECTION INTRAMUSCULAR; INTRAVENOUS at 14:56

## 2019-07-02 RX ADMIN — MORPHINE SULFATE 2 MG: 2 INJECTION, SOLUTION INTRAMUSCULAR; INTRAVENOUS at 01:14

## 2019-07-02 RX ADMIN — CIPROFLOXACIN 400 MG: 2 INJECTION, SOLUTION INTRAVENOUS at 11:17

## 2019-07-02 RX ADMIN — LISINOPRIL 2.5 MG: 5 TABLET ORAL at 08:29

## 2019-07-02 RX ADMIN — ONDANSETRON 4 MG: 2 INJECTION INTRAMUSCULAR; INTRAVENOUS at 14:55

## 2019-07-02 RX ADMIN — FAMOTIDINE 20 MG: 10 INJECTION, SOLUTION INTRAVENOUS at 14:19

## 2019-07-02 RX ADMIN — ROCURONIUM BROMIDE 50 MG: 10 INJECTION INTRAVENOUS at 14:26

## 2019-07-02 RX ADMIN — FENTANYL CITRATE 25 MCG: 50 INJECTION INTRAMUSCULAR; INTRAVENOUS at 15:17

## 2019-07-02 RX ADMIN — MORPHINE SULFATE 2 MG: 2 INJECTION, SOLUTION INTRAMUSCULAR; INTRAVENOUS at 06:25

## 2019-07-02 RX ADMIN — LIDOCAINE HYDROCHLORIDE 100 MG: 20 INJECTION, SOLUTION EPIDURAL; INFILTRATION; INTRACAUDAL; PERINEURAL at 14:24

## 2019-07-02 RX ADMIN — CIPROFLOXACIN 400 MG: 2 INJECTION, SOLUTION INTRAVENOUS at 23:46

## 2019-07-02 RX ADMIN — PROPOFOL 200 MG: 10 INJECTION, EMULSION INTRAVENOUS at 14:24

## 2019-07-02 RX ADMIN — SODIUM CHLORIDE: 9 INJECTION, SOLUTION INTRAVENOUS at 18:14

## 2019-07-02 RX ADMIN — SUGAMMADEX 200 MG: 100 INJECTION, SOLUTION INTRAVENOUS at 15:37

## 2019-07-02 RX ADMIN — BUPROPION HYDROCHLORIDE 200 MG: 100 TABLET, EXTENDED RELEASE ORAL at 08:29

## 2019-07-02 RX ADMIN — ONDANSETRON 4 MG: 2 INJECTION INTRAMUSCULAR; INTRAVENOUS at 11:28

## 2019-07-02 RX ADMIN — PHENYLEPHRINE HYDROCHLORIDE 100 MCG: 10 INJECTION INTRAVENOUS at 14:30

## 2019-07-02 RX ADMIN — Medication 160 MG: at 14:24

## 2019-07-02 RX ADMIN — OXYCODONE HYDROCHLORIDE AND ACETAMINOPHEN 1 TABLET: 5; 325 TABLET ORAL at 23:41

## 2019-07-02 RX ADMIN — FENTANYL CITRATE 100 MCG: 50 INJECTION INTRAMUSCULAR; INTRAVENOUS at 14:19

## 2019-07-02 RX ADMIN — MIDAZOLAM 2 MG: 1 INJECTION INTRAMUSCULAR; INTRAVENOUS at 14:18

## 2019-07-02 RX ADMIN — PHENYLEPHRINE HYDROCHLORIDE 100 MCG: 10 INJECTION INTRAVENOUS at 15:06

## 2019-07-02 RX ADMIN — SODIUM CHLORIDE: 9 INJECTION, SOLUTION INTRAVENOUS at 13:44

## 2019-07-02 RX ADMIN — FENTANYL CITRATE 25 MCG: 50 INJECTION INTRAMUSCULAR; INTRAVENOUS at 15:29

## 2019-07-02 ASSESSMENT — PULMONARY FUNCTION TESTS
PIF_VALUE: 19
PIF_VALUE: 26
PIF_VALUE: 1
PIF_VALUE: 1
PIF_VALUE: 26
PIF_VALUE: 23
PIF_VALUE: 23
PIF_VALUE: 24
PIF_VALUE: 26
PIF_VALUE: 25
PIF_VALUE: 1
PIF_VALUE: 25
PIF_VALUE: 24
PIF_VALUE: 23
PIF_VALUE: 7
PIF_VALUE: 1
PIF_VALUE: 24
PIF_VALUE: 24
PIF_VALUE: 22
PIF_VALUE: 19
PIF_VALUE: 25
PIF_VALUE: 7
PIF_VALUE: 25
PIF_VALUE: 1
PIF_VALUE: 23
PIF_VALUE: 24
PIF_VALUE: 18
PIF_VALUE: 21
PIF_VALUE: 23
PIF_VALUE: 25
PIF_VALUE: 24
PIF_VALUE: 11
PIF_VALUE: 22
PIF_VALUE: 21
PIF_VALUE: 24
PIF_VALUE: 21
PIF_VALUE: 25
PIF_VALUE: 23
PIF_VALUE: 23
PIF_VALUE: 19
PIF_VALUE: 1
PIF_VALUE: 25
PIF_VALUE: 23
PIF_VALUE: 20
PIF_VALUE: 24
PIF_VALUE: 23
PIF_VALUE: 1
PIF_VALUE: 23
PIF_VALUE: 25
PIF_VALUE: 18
PIF_VALUE: 24
PIF_VALUE: 25
PIF_VALUE: 0
PIF_VALUE: 24
PIF_VALUE: 22
PIF_VALUE: 1
PIF_VALUE: 21
PIF_VALUE: 1
PIF_VALUE: 23
PIF_VALUE: 1
PIF_VALUE: 25
PIF_VALUE: 20
PIF_VALUE: 24
PIF_VALUE: 1
PIF_VALUE: 8
PIF_VALUE: 1
PIF_VALUE: 25
PIF_VALUE: 19
PIF_VALUE: 6
PIF_VALUE: 25
PIF_VALUE: 22
PIF_VALUE: 25
PIF_VALUE: 1
PIF_VALUE: 26
PIF_VALUE: 19
PIF_VALUE: 25
PIF_VALUE: 9
PIF_VALUE: 25
PIF_VALUE: 25
PIF_VALUE: 24
PIF_VALUE: 24

## 2019-07-02 ASSESSMENT — PAIN DESCRIPTION - FREQUENCY
FREQUENCY: CONTINUOUS
FREQUENCY: CONTINUOUS

## 2019-07-02 ASSESSMENT — PAIN DESCRIPTION - DESCRIPTORS
DESCRIPTORS: ACHING;PRESSURE
DESCRIPTORS: ACHING
DESCRIPTORS: ACHING;SORE
DESCRIPTORS: SORE

## 2019-07-02 ASSESSMENT — PAIN DESCRIPTION - ORIENTATION
ORIENTATION: OTHER (COMMENT)
ORIENTATION: RIGHT

## 2019-07-02 ASSESSMENT — PAIN SCALES - GENERAL
PAINLEVEL_OUTOF10: 0
PAINLEVEL_OUTOF10: 4
PAINLEVEL_OUTOF10: 6
PAINLEVEL_OUTOF10: 0
PAINLEVEL_OUTOF10: 4
PAINLEVEL_OUTOF10: 6
PAINLEVEL_OUTOF10: 4

## 2019-07-02 ASSESSMENT — PAIN DESCRIPTION - PAIN TYPE
TYPE: SURGICAL PAIN
TYPE: ACUTE PAIN
TYPE: ACUTE PAIN

## 2019-07-02 ASSESSMENT — PAIN - FUNCTIONAL ASSESSMENT
PAIN_FUNCTIONAL_ASSESSMENT: 0-10
PAIN_FUNCTIONAL_ASSESSMENT: ACTIVITIES ARE NOT PREVENTED

## 2019-07-02 ASSESSMENT — PAIN DESCRIPTION - ONSET
ONSET: ON-GOING
ONSET: ON-GOING

## 2019-07-02 ASSESSMENT — PAIN DESCRIPTION - LOCATION
LOCATION: ABDOMEN

## 2019-07-02 ASSESSMENT — PAIN DESCRIPTION - PROGRESSION
CLINICAL_PROGRESSION: NOT CHANGED
CLINICAL_PROGRESSION: GRADUALLY WORSENING

## 2019-07-02 NOTE — ANESTHESIA PRE PROCEDURE
43.4 07/02/2019    MCV 82.2 07/02/2019    RDW 14.5 07/02/2019     07/02/2019     CMP    Lab Results   Component Value Date     07/02/2019    K 3.5 07/02/2019     07/02/2019    CO2 25 07/02/2019    BUN 8 07/02/2019    CREATININE <0.5 07/02/2019    GFRAA >60 07/02/2019    AGRATIO 1.1 07/01/2019    LABGLOM >60 07/02/2019    GLUCOSE 269 07/02/2019    PROT 7.5 07/02/2019    CALCIUM 8.7 07/02/2019    BILITOT 4.1 07/02/2019    ALKPHOS 248 07/02/2019     07/02/2019     07/02/2019     BMP    Lab Results   Component Value Date     07/02/2019    K 3.5 07/02/2019     07/02/2019    CO2 25 07/02/2019    BUN 8 07/02/2019    CREATININE <0.5 07/02/2019    CALCIUM 8.7 07/02/2019    GFRAA >60 07/02/2019    LABGLOM >60 07/02/2019    GLUCOSE 269 07/02/2019     POCGlucose  Recent Labs     07/01/19  0907 07/02/19  0656   GLUCOSE 261* 269*      Coags  No results found for: PROTIME, INR, APTT  HCG (If Applicable)   Lab Results   Component Value Date    PREGTESTUR Negative 07/01/2019      ABGs No results found for: PHART, PO2ART, NXE8GXZ, KYF7XSG, BEART, G2CZPZWS   Type & Screen (If Applicable)  No results found for: LABABO, LABRH                         BMI: Wt Readings from Last 3 Encounters:       NPO Status:   Date of last liquid consumption: 07/02/19   Time of last liquid consumption: 0800   Date of last solid food consumption: 06/30/19      Time of last solid consumption: 2100       Anesthesia Evaluation  Patient summary reviewed no history of anesthetic complications:   Airway: Mallampati: II  TM distance: >3 FB   Neck ROM: full  Mouth opening: > = 3 FB Dental:          Pulmonary:       (-) pneumonia, COPD and asthma                           Cardiovascular:        (-) hypertension, CAD, CABG/stent and dysrhythmias                Neuro/Psych:   (+) psychiatric history:   (-) seizures, neuromuscular disease, TIA and CVA           GI/Hepatic/Renal:        (-) GERD, PUD, hepatitis, liver

## 2019-07-03 LAB
A/G RATIO: 0.7 (ref 1.1–2.2)
ALBUMIN SERPL-MCNC: 2.9 G/DL (ref 3.4–5)
ALP BLD-CCNC: 312 U/L (ref 40–129)
ALT SERPL-CCNC: 219 U/L (ref 10–40)
ANION GAP SERPL CALCULATED.3IONS-SCNC: 15 MMOL/L (ref 3–16)
AST SERPL-CCNC: 187 U/L (ref 15–37)
BILIRUB SERPL-MCNC: 4.6 MG/DL (ref 0–1)
BUN BLDV-MCNC: 7 MG/DL (ref 7–20)
CALCIUM SERPL-MCNC: 8.3 MG/DL (ref 8.3–10.6)
CHLORIDE BLD-SCNC: 103 MMOL/L (ref 99–110)
CO2: 22 MMOL/L (ref 21–32)
CREAT SERPL-MCNC: <0.5 MG/DL (ref 0.6–1.1)
GFR AFRICAN AMERICAN: >60
GFR NON-AFRICAN AMERICAN: >60
GLOBULIN: 3.9 G/DL
GLUCOSE BLD-MCNC: 189 MG/DL (ref 70–99)
HCT VFR BLD CALC: 37.8 % (ref 36–48)
HEMOGLOBIN: 12.1 G/DL (ref 12–16)
MCH RBC QN AUTO: 26.3 PG (ref 26–34)
MCHC RBC AUTO-ENTMCNC: 32.1 G/DL (ref 31–36)
MCV RBC AUTO: 82 FL (ref 80–100)
PDW BLD-RTO: 14.5 % (ref 12.4–15.4)
PLATELET # BLD: 480 K/UL (ref 135–450)
PMV BLD AUTO: 8.3 FL (ref 5–10.5)
POTASSIUM SERPL-SCNC: 3.2 MMOL/L (ref 3.5–5.1)
RBC # BLD: 4.61 M/UL (ref 4–5.2)
SODIUM BLD-SCNC: 140 MMOL/L (ref 136–145)
TOTAL PROTEIN: 6.8 G/DL (ref 6.4–8.2)
WBC # BLD: 14.7 K/UL (ref 4–11)

## 2019-07-03 PROCEDURE — 94760 N-INVAS EAR/PLS OXIMETRY 1: CPT

## 2019-07-03 PROCEDURE — 6370000000 HC RX 637 (ALT 250 FOR IP): Performed by: SURGERY

## 2019-07-03 PROCEDURE — 6360000002 HC RX W HCPCS: Performed by: SURGERY

## 2019-07-03 PROCEDURE — 99024 POSTOP FOLLOW-UP VISIT: CPT | Performed by: SURGERY

## 2019-07-03 PROCEDURE — APPNB30 APP NON BILLABLE TIME 0-30 MINS: Performed by: NURSE PRACTITIONER

## 2019-07-03 PROCEDURE — 36415 COLL VENOUS BLD VENIPUNCTURE: CPT

## 2019-07-03 PROCEDURE — 2580000003 HC RX 258: Performed by: SURGERY

## 2019-07-03 PROCEDURE — 80053 COMPREHEN METABOLIC PANEL: CPT

## 2019-07-03 PROCEDURE — 1200000000 HC SEMI PRIVATE

## 2019-07-03 PROCEDURE — APPSS30 APP SPLIT SHARED TIME 16-30 MINUTES: Performed by: NURSE PRACTITIONER

## 2019-07-03 PROCEDURE — 85027 COMPLETE CBC AUTOMATED: CPT

## 2019-07-03 RX ADMIN — OXYCODONE HYDROCHLORIDE AND ACETAMINOPHEN 1 TABLET: 5; 325 TABLET ORAL at 05:37

## 2019-07-03 RX ADMIN — CIPROFLOXACIN 400 MG: 2 INJECTION, SOLUTION INTRAVENOUS at 11:19

## 2019-07-03 RX ADMIN — SODIUM CHLORIDE: 9 INJECTION, SOLUTION INTRAVENOUS at 15:58

## 2019-07-03 RX ADMIN — BUPROPION HYDROCHLORIDE 200 MG: 100 TABLET, EXTENDED RELEASE ORAL at 08:30

## 2019-07-03 RX ADMIN — ENOXAPARIN SODIUM 40 MG: 40 INJECTION SUBCUTANEOUS at 08:30

## 2019-07-03 RX ADMIN — LISINOPRIL 2.5 MG: 5 TABLET ORAL at 08:30

## 2019-07-03 RX ADMIN — SODIUM CHLORIDE: 9 INJECTION, SOLUTION INTRAVENOUS at 05:37

## 2019-07-03 RX ADMIN — OXYCODONE HYDROCHLORIDE AND ACETAMINOPHEN 1 TABLET: 5; 325 TABLET ORAL at 20:29

## 2019-07-03 RX ADMIN — OXYCODONE HYDROCHLORIDE AND ACETAMINOPHEN 1 TABLET: 5; 325 TABLET ORAL at 14:39

## 2019-07-03 ASSESSMENT — PAIN DESCRIPTION - LOCATION
LOCATION: ABDOMEN

## 2019-07-03 ASSESSMENT — PAIN SCALES - GENERAL
PAINLEVEL_OUTOF10: 0
PAINLEVEL_OUTOF10: 2
PAINLEVEL_OUTOF10: 0
PAINLEVEL_OUTOF10: 4

## 2019-07-03 ASSESSMENT — PAIN DESCRIPTION - PAIN TYPE
TYPE: SURGICAL PAIN

## 2019-07-03 ASSESSMENT — PAIN DESCRIPTION - ONSET
ONSET: GRADUAL

## 2019-07-03 ASSESSMENT — PAIN DESCRIPTION - PROGRESSION
CLINICAL_PROGRESSION: NOT CHANGED

## 2019-07-03 ASSESSMENT — PAIN DESCRIPTION - ORIENTATION
ORIENTATION: RIGHT
ORIENTATION: OTHER (COMMENT)
ORIENTATION: OTHER (COMMENT)

## 2019-07-03 ASSESSMENT — PAIN DESCRIPTION - DESCRIPTORS
DESCRIPTORS: ACHING;SORE

## 2019-07-03 ASSESSMENT — PAIN - FUNCTIONAL ASSESSMENT
PAIN_FUNCTIONAL_ASSESSMENT: ACTIVITIES ARE NOT PREVENTED

## 2019-07-03 ASSESSMENT — PAIN DESCRIPTION - FREQUENCY
FREQUENCY: CONTINUOUS

## 2019-07-03 NOTE — OP NOTE
[address] [place] [address]                                OPERATIVE REPORT    PATIENT NAME: [place]                       :        1975  MED REC NO:   [id number]                          ROOM:       5281  ACCOUNT NO:   [de-identified]                           ADMIT DATE: 2019  PROVIDER:     Ursula Dancer, MD      DATE OF PROCEDURE:  2019    PREOPERATIVE DIAGNOSIS:  Cholecystitis. POSTOPERATIVE DIAGNOSIS:  Severe cholecystitis. OPERATION PERFORMED:  1. Laparoscopic cholecystectomy with cholangiogram.  2.  Insertion of 19-Vietnamese round channel drain. SURGEON:  Ursula Dancer, MD    ANESTHESIA:  General endotracheal anesthesia. ASA CLASS: III. ANTIBIOTICS:  Scheduled Cipro. DVT PROPHYLAXIS:  Bilateral pneumatic compression devices. INDICATIONS:  The patient is a 42-year-old female who I have seen in the  office for symptomatic cholelithiasis. She at that time had  neutrophilic leukocytosis. There was a workup by Hematology that was  felt to be not an issue and we could proceed with surgery. She was  actually scheduled for next week but presented with worsening pain  consistent with acute cholecystitis on CT scan. She had elevated  bilirubin and an ERCP was performed prior with stent placement and  removal of common duct stone. As a result, I recommended laparoscopic  cholecystectomy with cholangiogram.  Risks, benefits, and alternatives  were discussed at length and she was agreeable to proceed. OPERATIVE PROCEDURE:  The patient was brought to the operating suite and  placed in the supine position on the operating room table. General  endotracheal anesthesia was induced that she tolerated well. The  abdomen was prepped and draped in usual sterile fashion. A timeout was  performed.     After injecting local anesthetic, a 5-mm infraumbilical incision was  performed and a Veress needle was inserted into the peritoneal cavity. The abdomen was insufflated with carbon dioxide gas that she tolerated  well. A 5-mm trocar was then inserted and the laparoscope then  followed. No injury was noted from Veress or trocar placement. A 10-mm  port was placed in the epigastrium and two 5-mm ports in the right upper  quadrant. The gallbladder was noted to be edematous with severe  cholecystitis and severely thickened. It was grasped and elevated above  the dome of the liver. I began our dissection near the triangle of Calot's. A long tubular  structure was identified and this was felt to likely be the common duct  since it had such significant size and was separate from the appearing  gallbladder. I was able to identify the node of Calot's, and I then  dissected up a little higher and was able to identify what appeared to  be a cystic duct that was severely shortened due to the chronic  inflammation. This was circumferentially dissected. A small ductotomy  was performed with hook scissors. Cholangiogram catheter was then inserted. We initially met resistance  in the shortened duct and essentially the catheter was placed right at  the cystic common bile duct junction. Cholangiogram showed good filling  of the bilateral hepatic radicles, common duct, common hepatic duct and  filled into the duodenum. The previously placed stent was identified as  well. No filling defects were identified, however, Main indication of cholangiogram was confirmed  by anatomy. The catheter was removed. The cystic duct was then  transected and a PDS Endoloop was used to secure the cystic duct right  at the junction with the common bile duct. I did not narrow the common  bile duct. Cystic artery was identified and clipped. The gallbladder  was bluntly dissected away from our common bile duct as we saw it diving  into the liver.   However, posteriorly a fairly large cystic structure  was also identified, it did appear to be pulsatile and collapsed with  dissection. I nicked it with scissors and indeed there was likely  posterior branch of the artery entering into the gallbladder and this  was clipped and divided as well. The gallbladder was removed from the  hepatic fossa using electrocautery and removed from the abdomen within a  laparoscopic retrieval bag. We turned our attention back to the area and some ooze was noted from  the liver bed and this was controlled with cautery. Due to chronic  inflammation and shortened cystic duct, I decided to place to 19-Cypriot  channel drain through our lateral trocar site. This was secured with  2-0 silk suture. The epigastric trocar site fascia was closed with an 0  Vicryl using a laparoscopic suture passer. The remainder of the trocars  were removed and 4-0 Vicryls closed subcuticularly. Skin Affix dermal  adhesive was applied. The patient tolerated the procedure well. She  was extubated in the operating room and taken to PACU in stable  condition. All counts were correct at the end of the case.         Divya Hernandez MD    D: 07/02/2019 16:01:18       T: 07/03/2019 2:22:56     [email or web address]  Job#: [id number]     Doc#: [id number]    CC:  Lux Beltran CNP

## 2019-07-04 LAB
A/G RATIO: 0.8 (ref 1.1–2.2)
ALBUMIN SERPL-MCNC: 3 G/DL (ref 3.4–5)
ALP BLD-CCNC: 352 U/L (ref 40–129)
ALT SERPL-CCNC: 148 U/L (ref 10–40)
ANION GAP SERPL CALCULATED.3IONS-SCNC: 13 MMOL/L (ref 3–16)
AST SERPL-CCNC: 79 U/L (ref 15–37)
BILIRUB SERPL-MCNC: 4.4 MG/DL (ref 0–1)
BUN BLDV-MCNC: 5 MG/DL (ref 7–20)
CALCIUM SERPL-MCNC: 8.5 MG/DL (ref 8.3–10.6)
CHLORIDE BLD-SCNC: 98 MMOL/L (ref 99–110)
CO2: 23 MMOL/L (ref 21–32)
CREAT SERPL-MCNC: <0.5 MG/DL (ref 0.6–1.1)
GFR AFRICAN AMERICAN: >60
GFR NON-AFRICAN AMERICAN: >60
GLOBULIN: 3.7 G/DL
GLUCOSE BLD-MCNC: 175 MG/DL (ref 70–99)
POTASSIUM SERPL-SCNC: 3.1 MMOL/L (ref 3.5–5.1)
SODIUM BLD-SCNC: 134 MMOL/L (ref 136–145)
TOTAL PROTEIN: 6.7 G/DL (ref 6.4–8.2)

## 2019-07-04 PROCEDURE — 36415 COLL VENOUS BLD VENIPUNCTURE: CPT

## 2019-07-04 PROCEDURE — 2580000003 HC RX 258: Performed by: SURGERY

## 2019-07-04 PROCEDURE — 6360000002 HC RX W HCPCS: Performed by: SURGERY

## 2019-07-04 PROCEDURE — 6370000000 HC RX 637 (ALT 250 FOR IP): Performed by: SURGERY

## 2019-07-04 PROCEDURE — 1200000000 HC SEMI PRIVATE

## 2019-07-04 PROCEDURE — 99024 POSTOP FOLLOW-UP VISIT: CPT | Performed by: SURGERY

## 2019-07-04 PROCEDURE — 94760 N-INVAS EAR/PLS OXIMETRY 1: CPT

## 2019-07-04 PROCEDURE — 93005 ELECTROCARDIOGRAM TRACING: CPT | Performed by: SURGERY

## 2019-07-04 PROCEDURE — 80053 COMPREHEN METABOLIC PANEL: CPT

## 2019-07-04 RX ORDER — POTASSIUM CHLORIDE 20 MEQ/1
40 TABLET, EXTENDED RELEASE ORAL PRN
Status: DISCONTINUED | OUTPATIENT
Start: 2019-07-04 | End: 2019-07-06 | Stop reason: HOSPADM

## 2019-07-04 RX ORDER — POTASSIUM CHLORIDE AND SODIUM CHLORIDE 450; 150 MG/100ML; MG/100ML
INJECTION, SOLUTION INTRAVENOUS CONTINUOUS
Status: DISCONTINUED | OUTPATIENT
Start: 2019-07-04 | End: 2019-07-06

## 2019-07-04 RX ORDER — POTASSIUM CHLORIDE 7.45 MG/ML
10 INJECTION INTRAVENOUS PRN
Status: DISCONTINUED | OUTPATIENT
Start: 2019-07-04 | End: 2019-07-06 | Stop reason: HOSPADM

## 2019-07-04 RX ADMIN — SODIUM CHLORIDE: 9 INJECTION, SOLUTION INTRAVENOUS at 00:25

## 2019-07-04 RX ADMIN — OXYCODONE HYDROCHLORIDE AND ACETAMINOPHEN 1 TABLET: 5; 325 TABLET ORAL at 00:29

## 2019-07-04 RX ADMIN — OXYCODONE HYDROCHLORIDE AND ACETAMINOPHEN 1 TABLET: 5; 325 TABLET ORAL at 12:53

## 2019-07-04 RX ADMIN — SODIUM CHLORIDE, PRESERVATIVE FREE 10 ML: 5 INJECTION INTRAVENOUS at 08:17

## 2019-07-04 RX ADMIN — POTASSIUM CHLORIDE 40 MEQ: 20 TABLET, EXTENDED RELEASE ORAL at 09:46

## 2019-07-04 RX ADMIN — CIPROFLOXACIN 400 MG: 2 INJECTION, SOLUTION INTRAVENOUS at 12:49

## 2019-07-04 RX ADMIN — ENOXAPARIN SODIUM 40 MG: 40 INJECTION SUBCUTANEOUS at 08:17

## 2019-07-04 RX ADMIN — OXYCODONE HYDROCHLORIDE AND ACETAMINOPHEN 2 TABLET: 5; 325 TABLET ORAL at 22:29

## 2019-07-04 RX ADMIN — BUPROPION HYDROCHLORIDE 200 MG: 100 TABLET, EXTENDED RELEASE ORAL at 08:12

## 2019-07-04 RX ADMIN — LISINOPRIL 2.5 MG: 5 TABLET ORAL at 08:13

## 2019-07-04 RX ADMIN — OXYCODONE HYDROCHLORIDE AND ACETAMINOPHEN 2 TABLET: 5; 325 TABLET ORAL at 18:00

## 2019-07-04 RX ADMIN — POTASSIUM CHLORIDE AND SODIUM CHLORIDE: 450; 150 INJECTION, SOLUTION INTRAVENOUS at 10:55

## 2019-07-04 RX ADMIN — CIPROFLOXACIN 400 MG: 2 INJECTION, SOLUTION INTRAVENOUS at 00:25

## 2019-07-04 RX ADMIN — OXYCODONE HYDROCHLORIDE AND ACETAMINOPHEN 2 TABLET: 5; 325 TABLET ORAL at 08:12

## 2019-07-04 RX ADMIN — SODIUM CHLORIDE: 9 INJECTION, SOLUTION INTRAVENOUS at 08:14

## 2019-07-04 ASSESSMENT — PAIN DESCRIPTION - ORIENTATION
ORIENTATION: RIGHT
ORIENTATION: OTHER (COMMENT)
ORIENTATION: RIGHT
ORIENTATION: RIGHT

## 2019-07-04 ASSESSMENT — PAIN DESCRIPTION - PROGRESSION
CLINICAL_PROGRESSION: RAPIDLY IMPROVING
CLINICAL_PROGRESSION: GRADUALLY IMPROVING
CLINICAL_PROGRESSION: RAPIDLY IMPROVING
CLINICAL_PROGRESSION: RAPIDLY IMPROVING
CLINICAL_PROGRESSION: NOT CHANGED
CLINICAL_PROGRESSION: GRADUALLY IMPROVING

## 2019-07-04 ASSESSMENT — PAIN DESCRIPTION - PAIN TYPE
TYPE: ACUTE PAIN
TYPE: ACUTE PAIN
TYPE: CHRONIC PAIN
TYPE: SURGICAL PAIN
TYPE: ACUTE PAIN
TYPE: ACUTE PAIN

## 2019-07-04 ASSESSMENT — PAIN DESCRIPTION - ONSET
ONSET: ON-GOING
ONSET: GRADUAL
ONSET: ON-GOING

## 2019-07-04 ASSESSMENT — ENCOUNTER SYMPTOMS: NAUSEA: 0

## 2019-07-04 ASSESSMENT — PAIN SCALES - GENERAL
PAINLEVEL_OUTOF10: 7
PAINLEVEL_OUTOF10: 0
PAINLEVEL_OUTOF10: 7
PAINLEVEL_OUTOF10: 0
PAINLEVEL_OUTOF10: 4
PAINLEVEL_OUTOF10: 3
PAINLEVEL_OUTOF10: 0
PAINLEVEL_OUTOF10: 2
PAINLEVEL_OUTOF10: 0
PAINLEVEL_OUTOF10: 0
PAINLEVEL_OUTOF10: 3
PAINLEVEL_OUTOF10: 8
PAINLEVEL_OUTOF10: 3

## 2019-07-04 ASSESSMENT — PAIN DESCRIPTION - FREQUENCY
FREQUENCY: CONTINUOUS

## 2019-07-04 ASSESSMENT — PAIN DESCRIPTION - LOCATION
LOCATION: ABDOMEN

## 2019-07-04 ASSESSMENT — PAIN DESCRIPTION - DESCRIPTORS
DESCRIPTORS: DULL;CONSTANT
DESCRIPTORS: DULL;CONSTANT
DESCRIPTORS: ACHING;SORE
DESCRIPTORS: NAGGING
DESCRIPTORS: DULL;CONSTANT
DESCRIPTORS: ACHING;TENDER

## 2019-07-04 ASSESSMENT — PAIN - FUNCTIONAL ASSESSMENT
PAIN_FUNCTIONAL_ASSESSMENT: PREVENTS OR INTERFERES SOME ACTIVE ACTIVITIES AND ADLS
PAIN_FUNCTIONAL_ASSESSMENT: ACTIVITIES ARE NOT PREVENTED
PAIN_FUNCTIONAL_ASSESSMENT: ACTIVITIES ARE NOT PREVENTED

## 2019-07-05 ENCOUNTER — ANESTHESIA EVENT (OUTPATIENT)
Dept: ENDOSCOPY | Age: 44
DRG: 419 | End: 2019-07-05
Payer: COMMERCIAL

## 2019-07-05 ENCOUNTER — APPOINTMENT (OUTPATIENT)
Dept: GENERAL RADIOLOGY | Age: 44
DRG: 419 | End: 2019-07-05
Payer: COMMERCIAL

## 2019-07-05 ENCOUNTER — ANESTHESIA (OUTPATIENT)
Dept: ENDOSCOPY | Age: 44
DRG: 419 | End: 2019-07-05
Payer: COMMERCIAL

## 2019-07-05 VITALS
RESPIRATION RATE: 17 BRPM | TEMPERATURE: 98.6 F | DIASTOLIC BLOOD PRESSURE: 52 MMHG | SYSTOLIC BLOOD PRESSURE: 87 MMHG | OXYGEN SATURATION: 100 %

## 2019-07-05 LAB
A/G RATIO: 0.6 (ref 1.1–2.2)
ALBUMIN SERPL-MCNC: 2.7 G/DL (ref 3.4–5)
ALP BLD-CCNC: 370 U/L (ref 40–129)
ALT SERPL-CCNC: 102 U/L (ref 10–40)
ANION GAP SERPL CALCULATED.3IONS-SCNC: 15 MMOL/L (ref 3–16)
AST SERPL-CCNC: 32 U/L (ref 15–37)
BILIRUB SERPL-MCNC: 2.4 MG/DL (ref 0–1)
BUN BLDV-MCNC: 4 MG/DL (ref 7–20)
CALCIUM SERPL-MCNC: 9.1 MG/DL (ref 8.3–10.6)
CHLORIDE BLD-SCNC: 97 MMOL/L (ref 99–110)
CO2: 21 MMOL/L (ref 21–32)
CREAT SERPL-MCNC: <0.5 MG/DL (ref 0.6–1.1)
EKG ATRIAL RATE: 138 BPM
EKG DIAGNOSIS: NORMAL
EKG P AXIS: 48 DEGREES
EKG P-R INTERVAL: 118 MS
EKG Q-T INTERVAL: 290 MS
EKG QRS DURATION: 70 MS
EKG QTC CALCULATION (BAZETT): 439 MS
EKG R AXIS: -4 DEGREES
EKG T AXIS: 35 DEGREES
EKG VENTRICULAR RATE: 138 BPM
GFR AFRICAN AMERICAN: >60
GFR NON-AFRICAN AMERICAN: >60
GLOBULIN: 4.7 G/DL
GLUCOSE BLD-MCNC: 148 MG/DL (ref 70–99)
GLUCOSE BLD-MCNC: 193 MG/DL (ref 70–99)
HCG(URINE) PREGNANCY TEST: NEGATIVE
HCT VFR BLD CALC: 33.2 % (ref 36–48)
HEMOGLOBIN: 11.1 G/DL (ref 12–16)
MCH RBC QN AUTO: 27.1 PG (ref 26–34)
MCHC RBC AUTO-ENTMCNC: 33.4 G/DL (ref 31–36)
MCV RBC AUTO: 81.2 FL (ref 80–100)
PDW BLD-RTO: 14.8 % (ref 12.4–15.4)
PERFORMED ON: ABNORMAL
PLATELET # BLD: 473 K/UL (ref 135–450)
PMV BLD AUTO: 8.2 FL (ref 5–10.5)
POTASSIUM SERPL-SCNC: 3.2 MMOL/L (ref 3.5–5.1)
RBC # BLD: 4.09 M/UL (ref 4–5.2)
SODIUM BLD-SCNC: 133 MMOL/L (ref 136–145)
TOTAL PROTEIN: 7.4 G/DL (ref 6.4–8.2)
WBC # BLD: 24.4 K/UL (ref 4–11)

## 2019-07-05 PROCEDURE — 74330 X-RAY BILE/PANC ENDOSCOPY: CPT

## 2019-07-05 PROCEDURE — 6370000000 HC RX 637 (ALT 250 FOR IP): Performed by: SURGERY

## 2019-07-05 PROCEDURE — 7100000000 HC PACU RECOVERY - FIRST 15 MIN: Performed by: INTERNAL MEDICINE

## 2019-07-05 PROCEDURE — 2709999900 HC NON-CHARGEABLE SUPPLY: Performed by: INTERNAL MEDICINE

## 2019-07-05 PROCEDURE — 2720000010 HC SURG SUPPLY STERILE: Performed by: INTERNAL MEDICINE

## 2019-07-05 PROCEDURE — 85027 COMPLETE CBC AUTOMATED: CPT

## 2019-07-05 PROCEDURE — 99024 POSTOP FOLLOW-UP VISIT: CPT | Performed by: SURGERY

## 2019-07-05 PROCEDURE — 0DP08DZ REMOVAL OF INTRALUMINAL DEVICE FROM UPPER INTESTINAL TRACT, VIA NATURAL OR ARTIFICIAL OPENING ENDOSCOPIC: ICD-10-PCS | Performed by: INTERNAL MEDICINE

## 2019-07-05 PROCEDURE — 80053 COMPREHEN METABOLIC PANEL: CPT

## 2019-07-05 PROCEDURE — 3609015200 HC ERCP REMOVE CALCULI/DEBRIS BILIARY/PANCREAS DUCT: Performed by: INTERNAL MEDICINE

## 2019-07-05 PROCEDURE — 36415 COLL VENOUS BLD VENIPUNCTURE: CPT

## 2019-07-05 PROCEDURE — 84703 CHORIONIC GONADOTROPIN ASSAY: CPT

## 2019-07-05 PROCEDURE — 1200000000 HC SEMI PRIVATE

## 2019-07-05 PROCEDURE — 3609015000 HC ERCP REMOVE FOREIGN BODY/STENT BILIARY/PANC DUCT: Performed by: INTERNAL MEDICINE

## 2019-07-05 PROCEDURE — C1769 GUIDE WIRE: HCPCS | Performed by: INTERNAL MEDICINE

## 2019-07-05 PROCEDURE — 6360000002 HC RX W HCPCS: Performed by: NURSE ANESTHETIST, CERTIFIED REGISTERED

## 2019-07-05 PROCEDURE — 2500000003 HC RX 250 WO HCPCS: Performed by: NURSE ANESTHETIST, CERTIFIED REGISTERED

## 2019-07-05 PROCEDURE — 6360000004 HC RX CONTRAST MEDICATION: Performed by: INTERNAL MEDICINE

## 2019-07-05 PROCEDURE — 3700000000 HC ANESTHESIA ATTENDED CARE: Performed by: INTERNAL MEDICINE

## 2019-07-05 PROCEDURE — 93010 ELECTROCARDIOGRAM REPORT: CPT | Performed by: INTERNAL MEDICINE

## 2019-07-05 PROCEDURE — 2580000003 HC RX 258: Performed by: NURSE ANESTHETIST, CERTIFIED REGISTERED

## 2019-07-05 PROCEDURE — 6360000002 HC RX W HCPCS: Performed by: SURGERY

## 2019-07-05 PROCEDURE — 2500000003 HC RX 250 WO HCPCS

## 2019-07-05 PROCEDURE — 3700000001 HC ADD 15 MINUTES (ANESTHESIA): Performed by: INTERNAL MEDICINE

## 2019-07-05 PROCEDURE — 7100000001 HC PACU RECOVERY - ADDTL 15 MIN: Performed by: INTERNAL MEDICINE

## 2019-07-05 RX ORDER — LIDOCAINE HYDROCHLORIDE 20 MG/ML
INJECTION, SOLUTION EPIDURAL; INFILTRATION; INTRACAUDAL; PERINEURAL PRN
Status: DISCONTINUED | OUTPATIENT
Start: 2019-07-05 | End: 2019-07-05 | Stop reason: SDUPTHER

## 2019-07-05 RX ORDER — PROPOFOL 10 MG/ML
INJECTION, EMULSION INTRAVENOUS PRN
Status: DISCONTINUED | OUTPATIENT
Start: 2019-07-05 | End: 2019-07-05 | Stop reason: SDUPTHER

## 2019-07-05 RX ORDER — ONDANSETRON 2 MG/ML
4 INJECTION INTRAMUSCULAR; INTRAVENOUS
Status: ACTIVE | OUTPATIENT
Start: 2019-07-05 | End: 2019-07-05

## 2019-07-05 RX ORDER — SODIUM CHLORIDE 9 MG/ML
INJECTION, SOLUTION INTRAVENOUS CONTINUOUS PRN
Status: DISCONTINUED | OUTPATIENT
Start: 2019-07-05 | End: 2019-07-05 | Stop reason: SDUPTHER

## 2019-07-05 RX ORDER — DEXAMETHASONE SODIUM PHOSPHATE 4 MG/ML
INJECTION, SOLUTION INTRA-ARTICULAR; INTRALESIONAL; INTRAMUSCULAR; INTRAVENOUS; SOFT TISSUE PRN
Status: DISCONTINUED | OUTPATIENT
Start: 2019-07-05 | End: 2019-07-05 | Stop reason: SDUPTHER

## 2019-07-05 RX ORDER — FENTANYL CITRATE 50 UG/ML
25 INJECTION, SOLUTION INTRAMUSCULAR; INTRAVENOUS EVERY 5 MIN PRN
Status: DISCONTINUED | OUTPATIENT
Start: 2019-07-05 | End: 2019-07-06 | Stop reason: HOSPADM

## 2019-07-05 RX ORDER — ONDANSETRON 2 MG/ML
INJECTION INTRAMUSCULAR; INTRAVENOUS PRN
Status: DISCONTINUED | OUTPATIENT
Start: 2019-07-05 | End: 2019-07-05 | Stop reason: SDUPTHER

## 2019-07-05 RX ORDER — FENTANYL CITRATE 50 UG/ML
50 INJECTION, SOLUTION INTRAMUSCULAR; INTRAVENOUS EVERY 5 MIN PRN
Status: DISCONTINUED | OUTPATIENT
Start: 2019-07-05 | End: 2019-07-06 | Stop reason: HOSPADM

## 2019-07-05 RX ORDER — SUCCINYLCHOLINE CHLORIDE 20 MG/ML
INJECTION INTRAMUSCULAR; INTRAVENOUS PRN
Status: DISCONTINUED | OUTPATIENT
Start: 2019-07-05 | End: 2019-07-05 | Stop reason: SDUPTHER

## 2019-07-05 RX ORDER — FENTANYL CITRATE 50 UG/ML
INJECTION, SOLUTION INTRAMUSCULAR; INTRAVENOUS PRN
Status: DISCONTINUED | OUTPATIENT
Start: 2019-07-05 | End: 2019-07-05 | Stop reason: SDUPTHER

## 2019-07-05 RX ADMIN — OXYCODONE HYDROCHLORIDE AND ACETAMINOPHEN 2 TABLET: 5; 325 TABLET ORAL at 16:47

## 2019-07-05 RX ADMIN — ENOXAPARIN SODIUM 40 MG: 40 INJECTION SUBCUTANEOUS at 16:40

## 2019-07-05 RX ADMIN — SODIUM CHLORIDE: 9 INJECTION, SOLUTION INTRAVENOUS at 15:01

## 2019-07-05 RX ADMIN — BUPROPION HYDROCHLORIDE 200 MG: 100 TABLET, EXTENDED RELEASE ORAL at 08:02

## 2019-07-05 RX ADMIN — DEXAMETHASONE SODIUM PHOSPHATE 8 MG: 4 INJECTION, SOLUTION INTRAMUSCULAR; INTRAVENOUS at 15:15

## 2019-07-05 RX ADMIN — PROPOFOL 200 MG: 10 INJECTION, EMULSION INTRAVENOUS at 15:06

## 2019-07-05 RX ADMIN — OXYCODONE HYDROCHLORIDE AND ACETAMINOPHEN 2 TABLET: 5; 325 TABLET ORAL at 23:22

## 2019-07-05 RX ADMIN — CIPROFLOXACIN 400 MG: 2 INJECTION, SOLUTION INTRAVENOUS at 11:55

## 2019-07-05 RX ADMIN — POTASSIUM CHLORIDE 40 MEQ: 20 TABLET, EXTENDED RELEASE ORAL at 11:04

## 2019-07-05 RX ADMIN — SUCCINYLCHOLINE CHLORIDE 100 MG: 20 INJECTION, SOLUTION INTRAMUSCULAR; INTRAVENOUS at 15:06

## 2019-07-05 RX ADMIN — OXYCODONE HYDROCHLORIDE AND ACETAMINOPHEN 2 TABLET: 5; 325 TABLET ORAL at 02:46

## 2019-07-05 RX ADMIN — LISINOPRIL 2.5 MG: 5 TABLET ORAL at 08:03

## 2019-07-05 RX ADMIN — FENTANYL CITRATE 100 MCG: 50 INJECTION INTRAMUSCULAR; INTRAVENOUS at 15:06

## 2019-07-05 RX ADMIN — OXYCODONE HYDROCHLORIDE AND ACETAMINOPHEN 2 TABLET: 5; 325 TABLET ORAL at 08:02

## 2019-07-05 RX ADMIN — CIPROFLOXACIN 400 MG: 2 INJECTION, SOLUTION INTRAVENOUS at 00:15

## 2019-07-05 RX ADMIN — ONDANSETRON 4 MG: 2 INJECTION INTRAMUSCULAR; INTRAVENOUS at 15:15

## 2019-07-05 RX ADMIN — LIDOCAINE HYDROCHLORIDE 60 MG: 20 INJECTION, SOLUTION EPIDURAL; INFILTRATION; INTRACAUDAL; PERINEURAL at 15:06

## 2019-07-05 ASSESSMENT — PULMONARY FUNCTION TESTS
PIF_VALUE: 13
PIF_VALUE: 13
PIF_VALUE: 25
PIF_VALUE: 1
PIF_VALUE: 18
PIF_VALUE: 13
PIF_VALUE: 5
PIF_VALUE: 3
PIF_VALUE: 5
PIF_VALUE: 3
PIF_VALUE: 13
PIF_VALUE: 19
PIF_VALUE: 1
PIF_VALUE: 13
PIF_VALUE: 25
PIF_VALUE: 0
PIF_VALUE: 3
PIF_VALUE: 0
PIF_VALUE: 13
PIF_VALUE: 12
PIF_VALUE: 14
PIF_VALUE: 0
PIF_VALUE: 13
PIF_VALUE: 0
PIF_VALUE: 13
PIF_VALUE: 0
PIF_VALUE: 0
PIF_VALUE: 15
PIF_VALUE: 19
PIF_VALUE: 0
PIF_VALUE: 23
PIF_VALUE: 3

## 2019-07-05 ASSESSMENT — PAIN SCALES - GENERAL
PAINLEVEL_OUTOF10: 0
PAINLEVEL_OUTOF10: 7
PAINLEVEL_OUTOF10: 3
PAINLEVEL_OUTOF10: 0
PAINLEVEL_OUTOF10: 7
PAINLEVEL_OUTOF10: 7
PAINLEVEL_OUTOF10: 0
PAINLEVEL_OUTOF10: 3
PAINLEVEL_OUTOF10: 7
PAINLEVEL_OUTOF10: 0

## 2019-07-05 ASSESSMENT — PAIN DESCRIPTION - PAIN TYPE
TYPE: ACUTE PAIN

## 2019-07-05 ASSESSMENT — PAIN DESCRIPTION - ORIENTATION
ORIENTATION: RIGHT

## 2019-07-05 ASSESSMENT — PAIN DESCRIPTION - ONSET
ONSET: ON-GOING

## 2019-07-05 ASSESSMENT — PAIN DESCRIPTION - DESCRIPTORS
DESCRIPTORS: ACHING;DULL;CONSTANT
DESCRIPTORS: DULL;CONSTANT
DESCRIPTORS: CRAMPING;CONSTANT

## 2019-07-05 ASSESSMENT — PAIN DESCRIPTION - FREQUENCY
FREQUENCY: CONTINUOUS

## 2019-07-05 ASSESSMENT — PAIN DESCRIPTION - PROGRESSION
CLINICAL_PROGRESSION: GRADUALLY WORSENING

## 2019-07-05 ASSESSMENT — PAIN DESCRIPTION - LOCATION
LOCATION: ABDOMEN

## 2019-07-05 ASSESSMENT — PAIN - FUNCTIONAL ASSESSMENT: PAIN_FUNCTIONAL_ASSESSMENT: ACTIVITIES ARE NOT PREVENTED

## 2019-07-05 NOTE — H&P
[address] [place] [address]   Pre-operative History and Physical    Patient: Pushpa Mark  : 1975  Acct#:     HISTORY OF PRESENT ILLNESS:    The patient is a 38 y.o. female who presents with ascending cholangitis s/p ERCP with mechanical lithotripsy and stent placement. Has had cholecystectomy. Now here for stent removal and duct clearance. Past Medical History:        Diagnosis Date    Anxiety     Depression     Diabetes mellitus ([address]       Past Surgical History:        Procedure Laterality Date     SECTION      CHOLECYSTECTOMY, LAPAROSCOPIC N/A 2019    LAPAROSCOPIC CHOLECYSTECTOMY WITH CHOLANGIOGRAM WITH C-ARM performed by Asad Srivastava MD at [address]      removal to bilateral eyes     ERCP N/A 2019    ERCP SPHINCTER/PAPILLOTOMY performed by Carisa Fuentes MD at [facility] ERCP N/A 2019    ERCP STONE REMOVAL performed by Carsia Fuentes MD at [facility] ERCP N/A 2019    ERCP STENT INSERTION performed by Carisa Fuentes MD at [facility] ERCP N/A 2019    ERCP STONE LITHOTRIPSY performed by Car[facility] Fuentes MD at [facility]      Medications Prior to Admission:   No current facility-administered medications on file prior to encounter. Current Outpatient Medications on File Prior to Encounter   Medication Sig Dispense Refill    calcium-vitamin D (OSCAL-500) 500-200 MG-UNIT per tablet Take 1 tablet by mouth daily      ibuprofen (ADVIL;MOTRIN) 600 MG tablet Take 1 tablet by mouth every 6 hours as needed for Pain 20 tablet 0    dicyclomine (BENTYL) 20 MG tablet Take 1 tablet by mouth every 6 hours as needed (cramps) 20 tablet 0    ondansetron (ZOFRAN ODT) 4 MG disintegrating tablet Take 1-2 tablets by mouth every 8 hours as needed for Nausea May Sub regular tablet (non-ODT) if insurance does not cover ODT.  20 tablet 0    fenofibrate (TRICOR) 145 MG tablet Take 1 tablet by mouth daily 30 tablet 3    levonorgestrel-ethinyl estradiol (SEASONALE) 0.15-0.03 MG per tablet Take 1 tablet by mouth daily       buPROPion (WELLBUTRIN SR) 200 MG extended release tablet Take 200 mg by mouth daily       metFORMIN (GLUCOPHAGE) 500 MG tablet Take 1 tablet by mouth 2 times daily (with meals) 60 tablet 5    lisinopril (PRINIVIL;ZESTRIL) 5 MG tablet Take 0.5 tablets by mouth daily 30 tablet 5        Allergies:  Patient has no known allergies.     Social History:   Social History     Socioeconomic History    Marital status: Single     Spouse name: Not on file    Number of children: Not on file    Years of education: Not on file    Highest education level: Not on file   Occupational History    Occupation: surveillence    Social Needs    Financial resource strain: Not on file    Food insecurity:     Worry: Not on file     Inability: Not on file    Transportation needs:     Medical: Not on file     Non-medical: Not on file   Tobacco Use    Smoking status: Former Smoker     Start date: 4/10/1989     Last attempt to quit: 2010     Years since quittin.5    Smokeless tobacco: Never Used   Substance and Sexual Activity    Alcohol use: Not Currently     Frequency: 2-3 times a week     Comment: 10/week    Drug use: Never    Sexual activity: Yes     Birth control/protection: Pill   Lifestyle    Physical activity:     Days per week: Not on file     Minutes per session: Not on file    Stress: Not on file   Relationships    Social connections:     Talks on phone: Not on file     Gets together: Not on file     Attends [employer] service: Not on file     Active member of club or organization: Not on file     Attends meetings of clubs or organizations: Not on file     Relationship status: Not on file    Intimate partner violence:     Fear of current or ex partner: Not on file     Emotionally abused: Not on file     Physically abused: Not on file     Forced sexual activity: Not on file   Other Topics Concern   

## 2019-07-05 NOTE — ANESTHESIA PRE PROCEDURE
Sharlene Mcdonald MD 75 mL/hr at 07/04/19 1055      potassium chloride (KLOR-CON M) extended release tablet 40 mEq  40 mEq Oral PRN Rafael Guerrero MD   40 mEq at 07/05/19 1104    Or    potassium bicarb-citric acid (EFFER-K) effervescent tablet 40 mEq  40 mEq Oral PRN Rafael Guerrero MD        Or    potassium chloride 10 mEq/100 mL IVPB (Peripheral Line)  10 mEq Intravenous PRN Rafael Guerrero MD        buPROPion [facility]) extended release tablet 200 mg  200 mg Oral Daily Asad Srivastava MD   200 mg at 07/05/19 0802    lisinopril (PRINIVIL;ZESTRIL) tablet 2.5 mg  2.5 mg Oral Daily Asad Srivastava MD   2.5 mg at 07/05/19 0803    sodium chloride flush 0.9 % injection 10 mL  10 mL Intravenous 2 times per day Asad Srivastava MD   10 mL at 07/04/19 0817    sodium chloride flush 0.9 % injection 10 mL  10 mL Intravenous PRN Asad Srivastava MD        acetaminophen (TYLENOL) tablet 650 mg  650 mg Oral Q4H PRN Asad Srivastava MD        ondansetron [facility]) injection 4 mg  4 mg Intravenous Q6H PRN Asad Srivastava MD   4 mg at 07/02/19 1128    enoxaparin (LOVENOX) injection 40 mg  40 mg Subcutaneous Daily Asad Srivastava MD   40 mg at 07/04/19 0817    ciprofloxacin (CIPRO) IVPB 400 mg  400 mg Intravenous Q12H Asad Srivastava MD   Stopped at 07/05/19 1308    morphine (PF) injection 2 mg  2 mg Intravenous Q2H PRN Asad Srivastava MD   2 mg at 07/02/19 9599    Or    morphine (PF) injection 4 mg  4 mg Intravenous Q2H PRN Asad Srivastava MD   4 mg at 07/01/19 1240    oxyCODONE-acetaminophen (PERCOCET) 5-325 MG per tablet 1 tablet  1 tablet Oral Q4H PRN Asad Srivastava MD   1 tablet at 07/04/19 1253    Or    oxyCODONE-acetaminophen (PERCOCET) 5-325 MG per tablet 2 tablet  2 tablet Oral Q4H PRN Asad Srivastava MD   2 tablet at 07/05/19 0802     Current Outpatient Medications on File Prior to Visit   Medication Sig Dispense Refill    calcium-vitamin D (OSCAL-500) 500-200 MG-UNIT per tablet Take 1 tablet by BMI  There is no height or weight on file to calculate BMI. Estimated body mass index is 33.99 kg/m² as calculated from the following:    Height as of 7/1/19: 5' 1\" (1.549 m). Weight as of an earlier encounter on 7/5/19: 179 lb 14.3 oz (81.6 kg).     CBC   Lab Results   Component Value Date    WBC 24.4 07/05/2019    RBC 4.09 07/05/2019    HGB 11.1 07/05/2019    HCT 33.2 07/05/2019    MCV 81.2 07/05/2019    RDW 14.8 07/05/2019     07/05/2019     CMP    Lab Results   Component Value Date     07/05/2019    K 3.2 07/05/2019    K 3.5 07/02/2019    CL 97 07/05/2019    CO2 21 07/05/2019    BUN 4 07/05/2019    CREATININE <0.5 07/05/2019    GFRAA >60 07/05/2019    AGRATIO 0.6 07/05/2019    LABGLOM >60 07/05/2019    GLUCOSE 193 07/05/2019    PROT 7.4 07/05/2019    CALCIUM 9.1 07/05/2019    BILITOT 2.4 07/05/2019    ALKPHOS 370 07/05/2019    AST 32 07/05/2019     07/05/2019     BMP    Lab Results   Component Value Date     07/05/2019    K 3.2 07/05/2019    K 3.5 07/02/2019    CL 97 07/05/2019    CO2 21 07/05/2019    BUN 4 07/05/2019    CREATININE <0.5 07/05/2019    CALCIUM 9.1 07/05/2019    GFRAA >60 07/05/2019    LABGLOM >60 07/05/2019    GLUCOSE 193 07/05/2019     POCGlucose  Recent Labs     07/03/19  0545 07/04/19  0821 07/05/19  1000   GLUCOSE 189* 175* 193*      Coags  No results found for: PROTIME, INR, APTT  HCG (If Applicable)   Lab Results   Component Value Date    PREGTESTUR Negative 07/05/2019      ABGs No results found for: PHART, PO2ART, FQK6PSL, QNH0VAN, BEART, F6HYETTK   Type & Screen (If Applicable)  No results found for: LABABO, LABRH                         BMI: Wt Readings from Last 3 Encounters:       NPO Status:  >8h                          Anesthesia Evaluation  Patient summary reviewed no history of anesthetic complications:   Airway: Mallampati: II  TM distance: >3 FB   Neck ROM: full  Mouth opening: > = 3 FB Dental:          Pulmonary: breath sounds clear to

## 2019-07-06 VITALS
RESPIRATION RATE: 15 BRPM | TEMPERATURE: 98.2 F | DIASTOLIC BLOOD PRESSURE: 81 MMHG | SYSTOLIC BLOOD PRESSURE: 108 MMHG | OXYGEN SATURATION: 95 % | HEIGHT: 61 IN | HEART RATE: 118 BPM | WEIGHT: 179.9 LBS | BODY MASS INDEX: 33.96 KG/M2

## 2019-07-06 LAB
A/G RATIO: 0.7 (ref 1.1–2.2)
ALBUMIN SERPL-MCNC: 2.9 G/DL (ref 3.4–5)
ALP BLD-CCNC: 367 U/L (ref 40–129)
ALT SERPL-CCNC: 67 U/L (ref 10–40)
ANION GAP SERPL CALCULATED.3IONS-SCNC: 13 MMOL/L (ref 3–16)
AST SERPL-CCNC: 22 U/L (ref 15–37)
BILIRUB SERPL-MCNC: 1.2 MG/DL (ref 0–1)
BUN BLDV-MCNC: 6 MG/DL (ref 7–20)
CALCIUM SERPL-MCNC: 9.3 MG/DL (ref 8.3–10.6)
CHLORIDE BLD-SCNC: 98 MMOL/L (ref 99–110)
CO2: 25 MMOL/L (ref 21–32)
CREAT SERPL-MCNC: <0.5 MG/DL (ref 0.6–1.1)
GFR AFRICAN AMERICAN: >60
GFR NON-AFRICAN AMERICAN: >60
GLOBULIN: 4.3 G/DL
GLUCOSE BLD-MCNC: 223 MG/DL (ref 70–99)
HCT VFR BLD CALC: 31.6 % (ref 36–48)
HEMOGLOBIN: 10.2 G/DL (ref 12–16)
MCH RBC QN AUTO: 26.2 PG (ref 26–34)
MCHC RBC AUTO-ENTMCNC: 32.2 G/DL (ref 31–36)
MCV RBC AUTO: 81.5 FL (ref 80–100)
PDW BLD-RTO: 15 % (ref 12.4–15.4)
PLATELET # BLD: 483 K/UL (ref 135–450)
PMV BLD AUTO: 8.4 FL (ref 5–10.5)
POTASSIUM SERPL-SCNC: 3.6 MMOL/L (ref 3.5–5.1)
RBC # BLD: 3.88 M/UL (ref 4–5.2)
SODIUM BLD-SCNC: 136 MMOL/L (ref 136–145)
TOTAL PROTEIN: 7.2 G/DL (ref 6.4–8.2)
WBC # BLD: 22.7 K/UL (ref 4–11)

## 2019-07-06 PROCEDURE — APPNB30 APP NON BILLABLE TIME 0-30 MINS: Performed by: PHYSICIAN ASSISTANT

## 2019-07-06 PROCEDURE — 6370000000 HC RX 637 (ALT 250 FOR IP): Performed by: SURGERY

## 2019-07-06 PROCEDURE — 99024 POSTOP FOLLOW-UP VISIT: CPT | Performed by: PHYSICIAN ASSISTANT

## 2019-07-06 PROCEDURE — 99024 POSTOP FOLLOW-UP VISIT: CPT | Performed by: SURGERY

## 2019-07-06 PROCEDURE — 6360000002 HC RX W HCPCS: Performed by: SURGERY

## 2019-07-06 PROCEDURE — 6370000000 HC RX 637 (ALT 250 FOR IP): Performed by: INTERNAL MEDICINE

## 2019-07-06 PROCEDURE — 94760 N-INVAS EAR/PLS OXIMETRY 1: CPT

## 2019-07-06 PROCEDURE — 80053 COMPREHEN METABOLIC PANEL: CPT

## 2019-07-06 PROCEDURE — 85027 COMPLETE CBC AUTOMATED: CPT

## 2019-07-06 PROCEDURE — APPSS30 APP SPLIT SHARED TIME 16-30 MINUTES: Performed by: PHYSICIAN ASSISTANT

## 2019-07-06 PROCEDURE — 36415 COLL VENOUS BLD VENIPUNCTURE: CPT

## 2019-07-06 RX ORDER — OXYCODONE HYDROCHLORIDE AND ACETAMINOPHEN 5; 325 MG/1; MG/1
1 TABLET ORAL EVERY 4 HOURS PRN
Qty: 15 TABLET | Refills: 0 | Status: SHIPPED | OUTPATIENT
Start: 2019-07-06 | End: 2019-07-13

## 2019-07-06 RX ORDER — PANTOPRAZOLE SODIUM 40 MG/1
40 TABLET, DELAYED RELEASE ORAL
Status: DISCONTINUED | OUTPATIENT
Start: 2019-07-06 | End: 2019-07-06 | Stop reason: HOSPADM

## 2019-07-06 RX ADMIN — POTASSIUM CHLORIDE AND SODIUM CHLORIDE: 450; 150 INJECTION, SOLUTION INTRAVENOUS at 01:18

## 2019-07-06 RX ADMIN — BUPROPION HYDROCHLORIDE 200 MG: 100 TABLET, EXTENDED RELEASE ORAL at 08:19

## 2019-07-06 RX ADMIN — LISINOPRIL 2.5 MG: 5 TABLET ORAL at 08:19

## 2019-07-06 RX ADMIN — CIPROFLOXACIN 400 MG: 2 INJECTION, SOLUTION INTRAVENOUS at 01:18

## 2019-07-06 RX ADMIN — OXYCODONE HYDROCHLORIDE AND ACETAMINOPHEN 1 TABLET: 5; 325 TABLET ORAL at 08:19

## 2019-07-06 RX ADMIN — PANTOPRAZOLE SODIUM 40 MG: 40 TABLET, DELAYED RELEASE ORAL at 08:19

## 2019-07-06 RX ADMIN — ENOXAPARIN SODIUM 40 MG: 40 INJECTION SUBCUTANEOUS at 08:19

## 2019-07-06 ASSESSMENT — PAIN DESCRIPTION - PROGRESSION: CLINICAL_PROGRESSION: GRADUALLY WORSENING

## 2019-07-06 ASSESSMENT — PAIN SCALES - GENERAL
PAINLEVEL_OUTOF10: 0
PAINLEVEL_OUTOF10: 0
PAINLEVEL_OUTOF10: 4

## 2019-07-06 ASSESSMENT — PAIN - FUNCTIONAL ASSESSMENT: PAIN_FUNCTIONAL_ASSESSMENT: ACTIVITIES ARE NOT PREVENTED

## 2019-07-06 ASSESSMENT — PAIN DESCRIPTION - FREQUENCY: FREQUENCY: CONTINUOUS

## 2019-07-06 ASSESSMENT — PAIN DESCRIPTION - ONSET: ONSET: ON-GOING

## 2019-07-06 ASSESSMENT — PAIN DESCRIPTION - PAIN TYPE: TYPE: ACUTE PAIN;SURGICAL PAIN

## 2019-07-06 ASSESSMENT — PAIN DESCRIPTION - LOCATION: LOCATION: ABDOMEN

## 2019-07-06 ASSESSMENT — PAIN DESCRIPTION - ORIENTATION: ORIENTATION: RIGHT

## 2019-07-06 ASSESSMENT — PAIN DESCRIPTION - DESCRIPTORS: DESCRIPTORS: CRAMPING;ACHING

## 2019-07-06 NOTE — PROGRESS NOTES
Awake and oriented. Wounds intact. Drain with scant amount bloody drainage. To room in bed.
Gastroenterology Progress Note    Pushpa Mark is a 38 y.o. female patient. Hospitalization Day:5    SUBJECTIVE:  No fever. S/P ERCP with stent removal and balloon sweeps without stones. ROS:  Gastrointestinal ROS: no abdominal pain, change in bowel habits, or black or bloody stools    Physical    VITALS:  /81   Pulse 118   Temp 98.2 °F (36.8 °C) (Oral)   Resp 15   Ht 5' 1\" (1.549 m)   Wt 179 lb 14.3 oz (81.6 kg)   SpO2 95%   BMI 33.99 kg/m²   TEMPERATURE:  Current - Temp: 98.2 °F (36.8 °C); Max - Temp  Av.1 °F (36.7 °C)  Min: 97.6 °F (36.4 °C)  Max: 98.6 °F (37 °C)    General:  Alert and oriented,  No apparent distress  Skin- without jaundice  Eyes: anicteric sclera  Cardiac: tachy, Nl s1s2, without murmurs  Lungs CTA Bilaterally, normal effort  Abdomen soft, ND, NT, no HSM, Bowel sounds normal  Ext: without edema  Neuro: no asterixis     Data    Data Review:    Recent Labs     19  1000 19  0934   WBC 24.4* 22.7*   HGB 11.1* 10.2*   HCT 33.2* 31.6*   MCV 81.2 81.5   * 483*     Recent Labs     19  0821 19  1000 19  0934   * 133* 136   K 3.1* 3.2* 3.6   CL 98* 97* 98*   CO2 23 21 25   BUN 5* 4* 6*   CREATININE <0.5* <0.5* <0.5*     Recent Labs     19  0819  1000 19  0934   AST 79* 32 22   * 102* 67*   BILITOT 4.4* 2.4* 1.2*   ALKPHOS 352* 370* 367*     No results for input(s): LIPASE, AMYLASE in the last 72 hours. No results for input(s): PROTIME, INR in the last 72 hours. Radiology Review:    FL ERCP BILIARY AND PANCREATIC S&I   Final Result   ERCP images demonstrating stent removal.  Please refer to the procedure   report for further details. FL CHOLANGIOGRAM OR   Final Result   Findings suspicious for choledocholithiasis. Please see subsequent ERCP. FL ERCP BILIARY AND PANCREATIC S&I   Final Result   Intraprocedural fluoroscopic spot images as above.   See separate procedure   report for more
Gastroenterology Progress Note    Sonali Mayorga is a 38 y.o. female patient. Hospitalization Day:2    SUBJECTIVE:  No pain. Eating lunch. POD # 1 lap jacob    ROS:  Gastrointestinal ROS: no abdominal pain, change in bowel habits, or black or bloody stools    Physical    VITALS:  BP (!) 146/107   Pulse 142   Temp 98.2 °F (36.8 °C) (Oral)   Resp 16   Ht 5' 1\" (1.549 m)   Wt 176 lb 5.9 oz (80 kg)   SpO2 94%   BMI 33.32 kg/m²   TEMPERATURE:  Current - Temp: 98.2 °F (36.8 °C); Max - Temp  Av.8 °F (36.6 °C)  Min: 96.8 °F (36 °C)  Max: 98.9 °F (37.2 °C)    General:  Alert and oriented,  No apparent distress  Skin- without jaundice  Eyes: anicteric sclera  Cardiac: tachy, Nl s1s2, without murmurs  Lungs CTA Bilaterally, normal effort  Abdomen soft, lap scars noted. ND, NT, no HSM, Bowel sounds normal  Ext: without edema  Neuro: no asterixis     Data    Data Review:    Recent Labs     19  0907 19  0656 19  0545   WBC 16.5* 11.2* 14.7*   HGB 13.7 13.9 12.1   HCT 41.9 43.4 37.8   MCV 82.3 82.2 82.0   * 563* 480*     Recent Labs     19  0907 19  0656 19  0545    141 140   K 4.2 3.5 3.2*    102 103   CO2 20* 25 22   BUN 12 8 7   CREATININE 0.6 <0.5* <0.5*     Recent Labs     19  0907 19  0656 19  0545   * 193* 187*   ALT 87* 203* 219*   BILIDIR  --  4.0*  --    BILITOT 1.6* 4.1* 4.6*   ALKPHOS 166* 248* 312*     Recent Labs     19  0907   LIPASE 17.0     No results for input(s): PROTIME, INR in the last 72 hours. Radiology Review:    FL CHOLANGIOGRAM OR   Final Result   Findings suspicious for choledocholithiasis. Please see subsequent ERCP. FL ERCP BILIARY AND PANCREATIC S&I   Preliminary Result   Intraprocedural fluoroscopic spot images as above. See separate procedure   report for more information.          CT ABDOMEN PELVIS W IV CONTRAST Additional Contrast? None   Final Result   Suggestive of acute
Pad site unremarkable
Pt alert and oriented. VSS, sinus tachy. Abdomen soft and round. Pt ready for transfer to floor.
Report called to floor nurse, Corey Martin. Pt resting.
Sedation provided per anesthesia. See anesthesia record for medication administration and vitals. Patient positioned semi-prone with head supported on foam ring pillow. Gel wedge positioned under right chest/upper abdomen. Patient secured to table with safety belt.
* 480*    140   K 3.5 3.2*    103   CO2 25 22   BUN 8 7   CREATININE <0.5* <0.5*   MG 2.20  --    CALCIUM 8.7 8.3   * 187*   * 219*   BILITOT 4.1* 4.6*   BILIDIR 4.0*  --        EDUCATION  Patient educated about Disease Process, Medications, Smoking Cessation, Oxygenation, Incentive Spirometry and Deep Breath and Cough, Diabetes, Hyperlipidemia, Smoking Cessation, Nutrition, Exercise and Hypertension    Electronically signed by VICKI Dangelo - CNP on 7/3/19 at 9:13 AM     [facility]   [phone] Office  [phone] Cell       Surgery Staff  I have examined this patient and read and agree with the note by VICKI Grullon from today. Stable postop. Reports pain improved  Drain serosang  Tbili slightly worse.   Repeat in AM  If labs improve then consider D/C tomorrow  Electronically signed by Kareen Quintero MD on 7/3/2019 at 10:31 AM
3.1*    98*   CO2 22 23   BUN 7 5*   CREATININE <0.5* <0.5*   CALCIUM 8.3 8.5   * 79*   * 148*   BILITOT 4.6* 4.4*       Electronically signed by Ajit Dan MD on 7/5/2019 at 8:13 AM
Medications, Smoking Cessation, Oxygenation, Incentive Spirometry and Deep Breath and Cough, Diabetes, Hyperlipidemia, Smoking Cessation, Nutrition, Exercise and Hypertension    Electronically signed by TESSA Ch on 7/6/19 at 9:14 AM    Agree with above note. The patient was personally seen and examined. Cornelius Gottlieb is doing well. Pain present but controlled. Denies nausea. Tolerating diet. Abd soft, ND, appropriately tender, incisions c/d/i, drain serosanguinous, removed at bedside    WBC 22.7 (24)    A/P: severe cholecystitis s/p lap cholecystectomy, s/p ERCP x 2    Doing well  ERCP yesterday without evidence of bile leak or pus, stent removed during procedure. Surgical drain removed at bedside which she tolerated well. Leukocytosis slightly improved. Do not think from biliary source. Per the patient she had an elevated WBC and has been seeing heme/onc as an outpatient. Will d/c home. Follow up with Dr. Gold Maldonado in 2 weeks.     Shabbir Vang MD
cholangitis- Still with bili of 4.6. Transaminitis improved. ALP still >300. s/p ERCP 7/1 with sphincterotomy, lithotriptor to large stone, biliary sweeps and stenting to CBD. Afebrile and no pain in RUQ (with exception of GLADYS drain soreness)   2) Cholecystitis - POD #1  3) Tachycardia NOS- sinus tach. afebrile. ? Etiology. No clinical signs of cholangitis. 4) Hypokalemia. Will recheck. On K supplement with IV fluids    PLAN :  1) ERCP with stent change and further balloon sweeps to CBD to ensure duct clearance  at 3pm with Dr. Diane Ying  2) NPO   3) Repeat CMP     2) Continue Cipro for now  3) Diet per surgery  4) Will follow. Thank you for allowing me to participate in the care of your patient. Please feel free to contact me with any concerns.   [address] [place], [place] [address]ronnie, DO

## 2019-07-08 ENCOUNTER — TELEPHONE (OUTPATIENT)
Dept: INTERNAL MEDICINE CLINIC | Age: 44
End: 2019-07-08

## 2019-07-10 NOTE — DISCHARGE SUMMARY
R-0Normal         CONTINUE these medications which have NOT CHANGED    Details   calcium-vitamin D (OSCAL-500) 500-200 MG-UNIT per tablet Take 1 tablet by mouth dailyHistorical Med      ibuprofen (ADVIL;MOTRIN) 600 MG tablet Take 1 tablet by mouth every 6 hours as needed for Pain, Disp-20 tablet, R-0Print      dicyclomine (BENTYL) 20 MG tablet Take 1 tablet by mouth every 6 hours as needed (cramps), Disp-20 tablet, R-0Print      ondansetron (ZOFRAN ODT) 4 MG disintegrating tablet Take 1-2 tablets by mouth every 8 hours as needed for Nausea May Sub regular tablet (non-ODT) if insurance does not cover ODT., Disp-20 tablet, R-0Print      fenofibrate (TRICOR) 145 MG tablet Take 1 tablet by mouth daily, Disp-30 tablet, R-3Normal      levonorgestrel-ethinyl estradiol (SEASONALE) 0.15-0.03 MG per tablet Take 1 tablet by mouth daily Historical Med      buPROPion (WELLBUTRIN SR) 200 MG extended release tablet Take 200 mg by mouth daily Historical Med      metFORMIN (GLUCOPHAGE) 500 MG tablet Take 1 tablet by mouth 2 times daily (with meals), Disp-60 tablet, R-5Normal      lisinopril (PRINIVIL;ZESTRIL) 5 MG tablet Take 0.5 tablets by mouth daily, Disp-30 tablet, R-5Normal             Discharged to:  home    Instruction:  The patient is instructed to return to the hospital or call the office for fevers > 101.5F, inability to tolerate a diet, or unexpected pain. Surgery specific discharge instruction sheet was provided to the patient.   Diet: low fat, low cholesterol diet   Activity: activity as tolerated    Follow up:  Dr. Philip Salter in 1-2 weeks     Electronically signed by TESSA Gomez on 7/10/2019 at 10:30 AM

## 2019-07-17 ENCOUNTER — OFFICE VISIT (OUTPATIENT)
Dept: SURGERY | Age: 44
End: 2019-07-17

## 2019-07-17 VITALS
DIASTOLIC BLOOD PRESSURE: 91 MMHG | HEART RATE: 116 BPM | BODY MASS INDEX: 31.18 KG/M2 | WEIGHT: 165 LBS | SYSTOLIC BLOOD PRESSURE: 132 MMHG

## 2019-07-17 DIAGNOSIS — K80.40 CHOLEDOCHOLITHIASIS WITH CHOLECYSTITIS: Primary | ICD-10-CM

## 2019-07-17 PROCEDURE — 99024 POSTOP FOLLOW-UP VISIT: CPT | Performed by: SURGERY

## 2019-07-18 ENCOUNTER — OFFICE VISIT (OUTPATIENT)
Dept: INTERNAL MEDICINE CLINIC | Age: 44
End: 2019-07-18
Payer: COMMERCIAL

## 2019-07-18 ENCOUNTER — TELEPHONE (OUTPATIENT)
Dept: SURGERY | Age: 44
End: 2019-07-18

## 2019-07-18 VITALS
SYSTOLIC BLOOD PRESSURE: 118 MMHG | DIASTOLIC BLOOD PRESSURE: 72 MMHG | OXYGEN SATURATION: 98 % | HEIGHT: 61 IN | RESPIRATION RATE: 18 BRPM | HEART RATE: 108 BPM | WEIGHT: 164.4 LBS | BODY MASS INDEX: 31.04 KG/M2 | TEMPERATURE: 98.4 F

## 2019-07-18 DIAGNOSIS — E11.9 TYPE 2 DIABETES MELLITUS WITHOUT COMPLICATION, WITHOUT LONG-TERM CURRENT USE OF INSULIN (HCC): Primary | ICD-10-CM

## 2019-07-18 DIAGNOSIS — F41.9 ANXIETY AND DEPRESSION: ICD-10-CM

## 2019-07-18 DIAGNOSIS — F32.A ANXIETY AND DEPRESSION: ICD-10-CM

## 2019-07-18 DIAGNOSIS — K58.9 IRRITABLE BOWEL SYNDROME WITHOUT DIARRHEA: ICD-10-CM

## 2019-07-18 DIAGNOSIS — I10 ESSENTIAL HYPERTENSION: ICD-10-CM

## 2019-07-18 DIAGNOSIS — E78.2 MIXED HYPERLIPIDEMIA: ICD-10-CM

## 2019-07-18 PROCEDURE — 99214 OFFICE O/P EST MOD 30 MIN: CPT | Performed by: NURSE PRACTITIONER

## 2019-07-18 ASSESSMENT — ENCOUNTER SYMPTOMS
COUGH: 0
COLOR CHANGE: 0
DIARRHEA: 0
SINUS PRESSURE: 0
BACK PAIN: 0
SINUS PAIN: 0
SORE THROAT: 0
EYE PAIN: 0
TROUBLE SWALLOWING: 0
VOMITING: 0
CHEST TIGHTNESS: 0
NAUSEA: 0
ABDOMINAL PAIN: 0
RHINORRHEA: 0
CONSTIPATION: 0
ORTHOPNEA: 0
PHOTOPHOBIA: 0
WHEEZING: 0
BLURRED VISION: 0
SHORTNESS OF BREATH: 0

## 2019-07-18 ASSESSMENT — PATIENT HEALTH QUESTIONNAIRE - PHQ9
1. LITTLE INTEREST OR PLEASURE IN DOING THINGS: 0
SUM OF ALL RESPONSES TO PHQ QUESTIONS 1-9: 0
SUM OF ALL RESPONSES TO PHQ QUESTIONS 1-9: 0
2. FEELING DOWN, DEPRESSED OR HOPELESS: 0
SUM OF ALL RESPONSES TO PHQ9 QUESTIONS 1 & 2: 0

## 2019-07-18 NOTE — PROGRESS NOTES
Frequency: 2-3 times a week     Comment: 10/week    Drug use: Never    Sexual activity: Yes     Birth control/protection: Pill   Lifestyle    Physical activity:     Days per week: Not on file     Minutes per session: Not on file    Stress: Not on file   Relationships    Social connections:     Talks on phone: Not on file     Gets together: Not on file     Attends Baptism service: Not on file     Active member of club or organization: Not on file     Attends meetings of clubs or organizations: Not on file     Relationship status: Not on file    Intimate partner violence:     Fear of current or ex partner: Not on file     Emotionally abused: Not on file     Physically abused: Not on file     Forced sexual activity: Not on file   Other Topics Concern    Not on file   Social History Narrative    Not on file       Review of Systems   Constitutional: Negative for activity change, appetite change, fever, malaise/fatigue and unexpected weight change. HENT: Negative for congestion, rhinorrhea, sinus pressure, sinus pain, sneezing, sore throat and trouble swallowing. Eyes: Negative for blurred vision, photophobia, pain and visual disturbance. Respiratory: Negative for cough, chest tightness, shortness of breath and wheezing. Cardiovascular: Negative for chest pain, palpitations, orthopnea, leg swelling and PND. Gastrointestinal: Negative for abdominal pain, constipation, diarrhea, nausea and vomiting. Genitourinary: Negative for difficulty urinating and hematuria. Musculoskeletal: Negative for arthralgias, back pain, gait problem, joint swelling, myalgias and neck pain. Skin: Negative for color change, pallor and rash. Minimal erythema around incision krystal from removal of tape, patient states \"improving\"    Neurological: Negative for dizziness, tremors, focal weakness, syncope, weakness, numbness and headaches. Hematological: Negative for adenopathy.    Psychiatric/Behavioral: Negative for warm and dry. No rash noted. No erythema. Minimal erythema to abdomen from tape removal, dry and intact   Psychiatric: She has a normal mood and affect. Her behavior is normal.        Assessment/Plan   Gena Leyav was seen today for diabetes. Diagnoses and all orders for this visit:    Type 2 diabetes mellitus without complication, without long-term current use of insulin ([facility] [address] stable  -a1c today 8.3%, improved from 11.7%, patient instructed to continue metformin/ACE as instructed    Essential hypertension, stable  -continue ACE as inicated    Mixed hyperlipidemia, stable  -continue tricor, patient encouraged on low fat/low chol diet    Irritable bowel syndrome without diarrhea, stable  -Continue bentryl therapy     Anxiety and depression, stable  -Continue Wellbutrin as indicated     All questions addressed and answered. Patient agrees with plan of care. F/u in 3 months for chronic disease management. No follow-ups on file.

## 2019-07-18 NOTE — TELEPHONE ENCOUNTER
Patient reports calling yesterday and was told her [facility] paperwork was going to be faxed by the end of the business day yesterday. She claims her work does not have it. I faxed this on 7/12/19. Please refax and notify patient.  Thanks

## 2019-10-18 ENCOUNTER — OFFICE VISIT (OUTPATIENT)
Dept: INTERNAL MEDICINE CLINIC | Age: 44
End: 2019-10-18
Payer: COMMERCIAL

## 2019-10-18 VITALS
OXYGEN SATURATION: 98 % | WEIGHT: 172 LBS | RESPIRATION RATE: 16 BRPM | DIASTOLIC BLOOD PRESSURE: 79 MMHG | HEART RATE: 101 BPM | TEMPERATURE: 98.5 F | BODY MASS INDEX: 32.47 KG/M2 | HEIGHT: 61 IN | SYSTOLIC BLOOD PRESSURE: 108 MMHG

## 2019-10-18 DIAGNOSIS — E11.9 TYPE 2 DIABETES MELLITUS WITHOUT COMPLICATION, WITHOUT LONG-TERM CURRENT USE OF INSULIN (HCC): Primary | ICD-10-CM

## 2019-10-18 DIAGNOSIS — I10 ESSENTIAL HYPERTENSION: ICD-10-CM

## 2019-10-18 DIAGNOSIS — F41.8 ANXIETY WITH DEPRESSION: ICD-10-CM

## 2019-10-18 DIAGNOSIS — E78.2 MIXED HYPERLIPIDEMIA: ICD-10-CM

## 2019-10-18 DIAGNOSIS — K58.0 IRRITABLE BOWEL SYNDROME WITH DIARRHEA: ICD-10-CM

## 2019-10-18 LAB
CHOLESTEROL, TOTAL: 170 MG/DL (ref 0–199)
CREATININE URINE: 290.9 MG/DL (ref 28–259)
HBA1C MFR BLD: 9.1 %
HDLC SERPL-MCNC: 41 MG/DL (ref 40–60)
LDL CHOLESTEROL CALCULATED: 107 MG/DL
MICROALBUMIN UR-MCNC: 3.1 MG/DL
MICROALBUMIN/CREAT UR-RTO: 10.7 MG/G (ref 0–30)
TRIGL SERPL-MCNC: 111 MG/DL (ref 0–150)
TSH REFLEX FT4: 3.7 UIU/ML (ref 0.27–4.2)
VLDLC SERPL CALC-MCNC: 22 MG/DL

## 2019-10-18 PROCEDURE — 99214 OFFICE O/P EST MOD 30 MIN: CPT | Performed by: NURSE PRACTITIONER

## 2019-10-18 PROCEDURE — 83036 HEMOGLOBIN GLYCOSYLATED A1C: CPT | Performed by: NURSE PRACTITIONER

## 2019-10-18 PROCEDURE — 36415 COLL VENOUS BLD VENIPUNCTURE: CPT | Performed by: NURSE PRACTITIONER

## 2019-10-18 ASSESSMENT — ENCOUNTER SYMPTOMS
VOMITING: 0
COUGH: 0
SHORTNESS OF BREATH: 0
BACK PAIN: 0
BLURRED VISION: 0
SINUS PAIN: 0
WHEEZING: 0
CONSTIPATION: 0
COLOR CHANGE: 0
RHINORRHEA: 0
EYE PAIN: 0
CHEST TIGHTNESS: 0
PHOTOPHOBIA: 0
ABDOMINAL PAIN: 0
ORTHOPNEA: 0
SORE THROAT: 0
SINUS PRESSURE: 0
NAUSEA: 0
DIARRHEA: 0
TROUBLE SWALLOWING: 0

## 2019-10-18 ASSESSMENT — PATIENT HEALTH QUESTIONNAIRE - PHQ9
2. FEELING DOWN, DEPRESSED OR HOPELESS: 0
SUM OF ALL RESPONSES TO PHQ QUESTIONS 1-9: 0
SUM OF ALL RESPONSES TO PHQ9 QUESTIONS 1 & 2: 0
SUM OF ALL RESPONSES TO PHQ QUESTIONS 1-9: 0
1. LITTLE INTEREST OR PLEASURE IN DOING THINGS: 0

## 2019-10-19 DIAGNOSIS — E78.2 MIXED HYPERLIPIDEMIA: Primary | ICD-10-CM

## 2019-10-19 RX ORDER — ATORVASTATIN CALCIUM 20 MG/1
20 TABLET, FILM COATED ORAL DAILY
Qty: 30 TABLET | Refills: 5 | Status: SHIPPED | OUTPATIENT
Start: 2019-10-19 | End: 2020-02-18

## 2019-11-15 DIAGNOSIS — E11.9 TYPE 2 DIABETES MELLITUS WITHOUT COMPLICATION, WITHOUT LONG-TERM CURRENT USE OF INSULIN (HCC): ICD-10-CM

## 2020-01-21 ENCOUNTER — OFFICE VISIT (OUTPATIENT)
Dept: INTERNAL MEDICINE CLINIC | Age: 45
End: 2020-01-21
Payer: COMMERCIAL

## 2020-01-21 VITALS
WEIGHT: 180 LBS | HEIGHT: 61 IN | DIASTOLIC BLOOD PRESSURE: 95 MMHG | SYSTOLIC BLOOD PRESSURE: 135 MMHG | OXYGEN SATURATION: 97 % | HEART RATE: 124 BPM | RESPIRATION RATE: 16 BRPM | BODY MASS INDEX: 33.99 KG/M2

## 2020-01-21 LAB
A/G RATIO: 1.3 (ref 1.1–2.2)
ALBUMIN SERPL-MCNC: 4.3 G/DL (ref 3.4–5)
ALP BLD-CCNC: 95 U/L (ref 40–129)
ALT SERPL-CCNC: 11 U/L (ref 10–40)
ANION GAP SERPL CALCULATED.3IONS-SCNC: 18 MMOL/L (ref 3–16)
AST SERPL-CCNC: 13 U/L (ref 15–37)
BILIRUB SERPL-MCNC: <0.2 MG/DL (ref 0–1)
BUN BLDV-MCNC: 9 MG/DL (ref 7–20)
CALCIUM SERPL-MCNC: 10.7 MG/DL (ref 8.3–10.6)
CHLORIDE BLD-SCNC: 101 MMOL/L (ref 99–110)
CO2: 21 MMOL/L (ref 21–32)
CREAT SERPL-MCNC: 0.6 MG/DL (ref 0.6–1.1)
GFR AFRICAN AMERICAN: >60
GFR NON-AFRICAN AMERICAN: >60
GLOBULIN: 3.2 G/DL
GLUCOSE BLD-MCNC: 241 MG/DL (ref 70–99)
HBA1C MFR BLD: 10.9 %
POTASSIUM SERPL-SCNC: 3.9 MMOL/L (ref 3.5–5.1)
SODIUM BLD-SCNC: 140 MMOL/L (ref 136–145)
TOTAL PROTEIN: 7.5 G/DL (ref 6.4–8.2)

## 2020-01-21 PROCEDURE — G8427 DOCREV CUR MEDS BY ELIG CLIN: HCPCS | Performed by: NURSE PRACTITIONER

## 2020-01-21 PROCEDURE — G8484 FLU IMMUNIZE NO ADMIN: HCPCS | Performed by: NURSE PRACTITIONER

## 2020-01-21 PROCEDURE — 3046F HEMOGLOBIN A1C LEVEL >9.0%: CPT | Performed by: NURSE PRACTITIONER

## 2020-01-21 PROCEDURE — 90715 TDAP VACCINE 7 YRS/> IM: CPT | Performed by: NURSE PRACTITIONER

## 2020-01-21 PROCEDURE — 90670 PCV13 VACCINE IM: CPT | Performed by: NURSE PRACTITIONER

## 2020-01-21 PROCEDURE — 99214 OFFICE O/P EST MOD 30 MIN: CPT | Performed by: NURSE PRACTITIONER

## 2020-01-21 PROCEDURE — 90472 IMMUNIZATION ADMIN EACH ADD: CPT | Performed by: NURSE PRACTITIONER

## 2020-01-21 PROCEDURE — 83036 HEMOGLOBIN GLYCOSYLATED A1C: CPT | Performed by: NURSE PRACTITIONER

## 2020-01-21 PROCEDURE — 36415 COLL VENOUS BLD VENIPUNCTURE: CPT | Performed by: NURSE PRACTITIONER

## 2020-01-21 PROCEDURE — 90471 IMMUNIZATION ADMIN: CPT | Performed by: NURSE PRACTITIONER

## 2020-01-21 PROCEDURE — 1036F TOBACCO NON-USER: CPT | Performed by: NURSE PRACTITIONER

## 2020-01-21 PROCEDURE — G8417 CALC BMI ABV UP PARAM F/U: HCPCS | Performed by: NURSE PRACTITIONER

## 2020-01-21 PROCEDURE — 2022F DILAT RTA XM EVC RTNOPTHY: CPT | Performed by: NURSE PRACTITIONER

## 2020-01-21 RX ORDER — GLIPIZIDE 10 MG/1
10 TABLET ORAL DAILY
Qty: 60 TABLET | Refills: 3 | Status: SHIPPED | OUTPATIENT
Start: 2020-01-21 | End: 2020-08-25

## 2020-01-21 RX ORDER — AZITHROMYCIN 250 MG/1
TABLET, FILM COATED ORAL
Qty: 1 PACKET | Refills: 0 | Status: SHIPPED | OUTPATIENT
Start: 2020-01-21 | End: 2020-01-28

## 2020-01-21 SDOH — ECONOMIC STABILITY: TRANSPORTATION INSECURITY
IN THE PAST 12 MONTHS, HAS THE LACK OF TRANSPORTATION KEPT YOU FROM MEDICAL APPOINTMENTS OR FROM GETTING MEDICATIONS?: NO

## 2020-01-21 SDOH — ECONOMIC STABILITY: FOOD INSECURITY: WITHIN THE PAST 12 MONTHS, YOU WORRIED THAT YOUR FOOD WOULD RUN OUT BEFORE YOU GOT MONEY TO BUY MORE.: NEVER TRUE

## 2020-01-21 SDOH — ECONOMIC STABILITY: INCOME INSECURITY: HOW HARD IS IT FOR YOU TO PAY FOR THE VERY BASICS LIKE FOOD, HOUSING, MEDICAL CARE, AND HEATING?: NOT HARD AT ALL

## 2020-01-21 SDOH — ECONOMIC STABILITY: TRANSPORTATION INSECURITY
IN THE PAST 12 MONTHS, HAS LACK OF TRANSPORTATION KEPT YOU FROM MEETINGS, WORK, OR FROM GETTING THINGS NEEDED FOR DAILY LIVING?: NO

## 2020-01-21 SDOH — ECONOMIC STABILITY: FOOD INSECURITY: WITHIN THE PAST 12 MONTHS, THE FOOD YOU BOUGHT JUST DIDN'T LAST AND YOU DIDN'T HAVE MONEY TO GET MORE.: NEVER TRUE

## 2020-01-21 ASSESSMENT — ENCOUNTER SYMPTOMS
SHORTNESS OF BREATH: 0
CHEST TIGHTNESS: 0
COLOR CHANGE: 0
CONSTIPATION: 0
EYE PAIN: 0
RHINORRHEA: 0
COUGH: 0
BLURRED VISION: 0
VOMITING: 0
ORTHOPNEA: 0
SINUS PRESSURE: 0
BACK PAIN: 0
SINUS PAIN: 0
SORE THROAT: 0
WHEEZING: 0
NAUSEA: 1
SWOLLEN GLANDS: 0
PHOTOPHOBIA: 0
ABDOMINAL PAIN: 0
DIARRHEA: 0
TROUBLE SWALLOWING: 0

## 2020-01-21 ASSESSMENT — PATIENT HEALTH QUESTIONNAIRE - PHQ9
SUM OF ALL RESPONSES TO PHQ QUESTIONS 1-9: 0
SUM OF ALL RESPONSES TO PHQ9 QUESTIONS 1 & 2: 0
2. FEELING DOWN, DEPRESSED OR HOPELESS: 0
SUM OF ALL RESPONSES TO PHQ QUESTIONS 1-9: 0
1. LITTLE INTEREST OR PLEASURE IN DOING THINGS: 0

## 2020-01-21 NOTE — PROGRESS NOTES
Pt is here for: DM type II, anxiety/depression, HTN, HLD, URI     This is a 38 yr old CF, with a known past medical hx that includes DM type II, HTN, HLD and anxiety/depression, that presents today for f/u in regards to chronic disease management. Patient also presents with URI. Diabetes   She presents for her follow-up diabetic visit. She has type 2 diabetes mellitus. Her disease course has been improving. There are no hypoglycemic associated symptoms. Pertinent negatives for hypoglycemia include no confusion, dizziness, headaches, nervousness/anxiousness, pallor, sweats or tremors. There are no diabetic associated symptoms. Pertinent negatives for diabetes include no blurred vision, no chest pain, no fatigue, no polydipsia, no polyphagia, no polyuria and no weakness. There are no hypoglycemic complications. Symptoms are stable. There are no diabetic complications. Risk factors for coronary artery disease include family history, dyslipidemia, diabetes mellitus, hypertension, stress and sedentary lifestyle. Current diabetic treatment includes oral agent (monotherapy). She is compliant with treatment most of the time. Her weight is decreasing steadily. She is following a generally healthy diet. She rarely participates in exercise. An ACE inhibitor/angiotensin II receptor blocker is being taken. She does not see a podiatrist.Eye exam is current. Hypertension   This is a chronic problem. The current episode started more than 1 year ago. The problem is unchanged. The problem is controlled. Pertinent negatives include no anxiety, blurred vision, chest pain, headaches, malaise/fatigue, neck pain, orthopnea, palpitations, peripheral edema, PND, shortness of breath or sweats. Risk factors for coronary artery disease include dyslipidemia, family history, diabetes mellitus, obesity, stress and sedentary lifestyle. Past treatments include ACE inhibitors. The current treatment provides significant improvement.  Compliance Pupils: Pupils are equal, round, and reactive to light. Neck:      Musculoskeletal: Normal range of motion. Thyroid: No thyromegaly. Vascular: No carotid bruit or JVD. Trachea: No tracheal deviation. Cardiovascular:      Rate and Rhythm: Regular rhythm. Tachycardia present. Heart sounds: No murmur. Pulmonary:      Effort: No respiratory distress. Breath sounds: Normal breath sounds. No stridor. No wheezing, rhonchi or rales. Chest:      Chest wall: No tenderness. Abdominal:      General: Bowel sounds are normal. There is no distension. Palpations: Abdomen is soft. There is no mass. Tenderness: There is no tenderness. There is no guarding or rebound. Musculoskeletal: Normal range of motion. General: No tenderness. Right lower leg: No edema. Left lower leg: No edema. Lymphadenopathy:      Cervical: No cervical adenopathy. Skin:     General: Skin is warm and dry. Coloration: Skin is not jaundiced. Findings: No bruising, erythema, lesion or rash. Neurological:      General: No focal deficit present. Mental Status: She is alert and oriented to person, place, and time. Mental status is at baseline. Cranial Nerves: No cranial nerve deficit. Coordination: Coordination normal.      Deep Tendon Reflexes: Reflexes are normal and symmetric. Psychiatric:         Behavior: Behavior normal.         Thought Content: Thought content normal.         Judgment: Judgment normal.          Assessment/Plan   Justus Wong was seen today for diabetes.   Diagnoses and all orders for this visit:    Type 2 diabetes mellitus without complication, without long-term current use of insulin ([address] stable  -a1c today 10.9%, was 9.1% 10/2019, will continue Metformin, ADD glipizide, patient doesn't want a home monitor,states \"I don't want to check my insulin daily, I will just watch my diet\"   -patient instructed to continue metformin/ACE as instructed    Essential hypertension, stable  -continue ACE as inicated    Mixed hyperlipidemia, stable  -continue tricor, patient encouraged on low fat/low chol diet  -continue statin, goal LDL <70, prior     Irritable bowel syndrome with diarrhea, stable  -Continue bentryl therapy     Anxiety and depression, stable  -Continue Wellbutrin as indicated     Acute non-recurrent frontal sinusitis  -     azithromycin (ZITHROMAX) 250 MG tablet; Take 2 tablets (500 mg) on Day 1, followed by 1 tablet (250 mg) once daily on Days 2 through 5. Tachycardia, stable, asymptomatic, suspect 2/2 to URI   -     COMPREHENSIVE METABOLIC PANEL; Future    Other orders  -     Tdap (age 10y-63y) IM (Adacel)  -     Pneumococcal conjugate vaccine 13-valent    All questions addressed and answered. Patient agrees with plan of care. F/u in 1 week in regards to URI/tachycardia          No follow-ups on file.

## 2020-01-22 DIAGNOSIS — K80.20 SYMPTOMATIC CHOLELITHIASIS: ICD-10-CM

## 2020-01-22 DIAGNOSIS — K81.0 ACUTE CHOLECYSTITIS: ICD-10-CM

## 2020-01-22 LAB — PARATHYROID HORMONE INTACT: 6.8 PG/ML (ref 14–72)

## 2020-01-24 DIAGNOSIS — K80.20 SYMPTOMATIC CHOLELITHIASIS: ICD-10-CM

## 2020-01-24 DIAGNOSIS — R00.0 TACHYCARDIA: ICD-10-CM

## 2020-01-24 LAB — PHOSPHORUS: 4.1 MG/DL (ref 2.5–4.9)

## 2020-01-28 ENCOUNTER — OFFICE VISIT (OUTPATIENT)
Dept: INTERNAL MEDICINE CLINIC | Age: 45
End: 2020-01-28
Payer: COMMERCIAL

## 2020-01-28 VITALS
DIASTOLIC BLOOD PRESSURE: 81 MMHG | OXYGEN SATURATION: 97 % | HEIGHT: 61 IN | WEIGHT: 180 LBS | TEMPERATURE: 98.9 F | HEART RATE: 109 BPM | BODY MASS INDEX: 33.99 KG/M2 | SYSTOLIC BLOOD PRESSURE: 111 MMHG | RESPIRATION RATE: 16 BRPM

## 2020-01-28 PROCEDURE — 1036F TOBACCO NON-USER: CPT | Performed by: NURSE PRACTITIONER

## 2020-01-28 PROCEDURE — G8427 DOCREV CUR MEDS BY ELIG CLIN: HCPCS | Performed by: NURSE PRACTITIONER

## 2020-01-28 PROCEDURE — G8417 CALC BMI ABV UP PARAM F/U: HCPCS | Performed by: NURSE PRACTITIONER

## 2020-01-28 PROCEDURE — 99213 OFFICE O/P EST LOW 20 MIN: CPT | Performed by: NURSE PRACTITIONER

## 2020-01-28 PROCEDURE — G8484 FLU IMMUNIZE NO ADMIN: HCPCS | Performed by: NURSE PRACTITIONER

## 2020-01-28 ASSESSMENT — ENCOUNTER SYMPTOMS
CHEST TIGHTNESS: 0
COUGH: 0
SHORTNESS OF BREATH: 0

## 2020-01-28 NOTE — PROGRESS NOTES
Pt is here for:  F/u on tachycardia       HPI  Tachycardia  This problem is chronic and stable. Patient denies any associated SOB/CP, dizziness, lightheadedness, cough, fever, or palpitations. Patient states \"It's always like this in the doctors office, when I check it at home on my watch its in the 90's\". Patient states that she was recently taking decongestants for URI, which may have made it a little higher. Will monitor. /81 (Site: Left Upper Arm, Position: Sitting, Cuff Size: Large Adult)   Pulse 109   Temp 98.9 °F (37.2 °C) (Oral)   Resp 16   Ht 5' 1\" (1.549 m)   Wt 180 lb (81.6 kg)   SpO2 97%   Breastfeeding No   BMI 34.01 kg/m²     Review of Systems   Constitutional: Negative for activity change, appetite change, chills, fatigue, fever and unexpected weight change. Respiratory: Negative for cough, chest tightness and shortness of breath. Cardiovascular: Negative for chest pain, palpitations and leg swelling. Neurological: Negative for dizziness, light-headedness and headaches. Psychiatric/Behavioral: The patient is nervous/anxious. Physical Exam  Constitutional:       General: She is not in acute distress. Appearance: Normal appearance. She is not ill-appearing or toxic-appearing. Cardiovascular:      Rate and Rhythm: Tachycardia present. Heart sounds: Normal heart sounds. Pulmonary:      Effort: Pulmonary effort is normal. No respiratory distress. Breath sounds: Normal breath sounds. No stridor. No wheezing or rales. Chest:      Chest wall: No tenderness. Skin:     General: Skin is warm and dry. Neurological:      General: No focal deficit present. Mental Status: She is alert. Psychiatric:         Mood and Affect: Mood is anxious. Assessment/Plan   Gurwinder Plasencia was seen today for palpitations.   Diagnoses and all orders for this visit:    Tachycardia, asymptommatic  -EKG reviewed from 07/2019, ST noted, given patient reports of \"HR in the 90's at home\", will suspect 2/2 to anxiety  -will continue to monitor     All questions addressed and answered, patient agrees with plan of care.

## 2020-06-02 ENCOUNTER — OFFICE VISIT (OUTPATIENT)
Dept: PRIMARY CARE CLINIC | Age: 45
End: 2020-06-02
Payer: COMMERCIAL

## 2020-06-02 VITALS — OXYGEN SATURATION: 99 % | HEART RATE: 110 BPM | TEMPERATURE: 98.7 F

## 2020-06-02 PROCEDURE — G8427 DOCREV CUR MEDS BY ELIG CLIN: HCPCS | Performed by: INTERNAL MEDICINE

## 2020-06-02 PROCEDURE — 99212 OFFICE O/P EST SF 10 MIN: CPT | Performed by: INTERNAL MEDICINE

## 2020-06-02 PROCEDURE — 1036F TOBACCO NON-USER: CPT | Performed by: INTERNAL MEDICINE

## 2020-06-02 PROCEDURE — G8417 CALC BMI ABV UP PARAM F/U: HCPCS | Performed by: INTERNAL MEDICINE

## 2020-06-02 NOTE — PATIENT INSTRUCTIONS
Advance Care Planning  People with COVID-19 may have no symptoms, mild symptoms, such as fever, cough, and shortness of breath or they may have more severe illness, developing severe and fatal pneumonia. As a result, Advance Care Planning with attention to naming a health care decision maker (someone you trust to make healthcare decisions for you if you could not speak for yourself) and sharing other health care preferences is important BEFORE a possible health crisis. Please contact your Primary Care Provider to discuss Advance Care Planning. Preventing the Spread of Coronavirus Disease 2019 in Homes and Residential Communities  For the most recent information go to [email or web address]    Prevention steps for People with confirmed or suspected COVID-19 (including persons under investigation) who do not need to be hospitalized  and   People with confirmed COVID-19 who were hospitalized and determined to be medically stable to go home    Your healthcare provider and public health staff will evaluate whether you can be cared for at home. If it is determined that you do not need to be hospitalized and can be isolated at home, you will be monitored by staff from your local or state health department. You should follow the prevention steps below until a healthcare provider or local or state health department says you can return to your normal activities. Stay home except to get medical care  People who are mildly ill with COVID-19 are able to isolate at home during their illness. You should restrict activities outside your home, except for getting medical care. Do not go to work, school, or public areas. Avoid using public transportation, ride-sharing, or taxis. Separate yourself from other people and animals in your home  People: As much as possible, you should stay in a specific room and away from other people in your home.  Also, you should use a separate

## 2020-06-04 LAB
SARS-COV-2: NOT DETECTED
SOURCE: NORMAL

## 2020-06-05 NOTE — RESULT ENCOUNTER NOTE
Please contact patient with their testing results: Your test for COVID-19, also known as novel coronavirus, came back negative. No virus was detected from the sample collected. Until your symptoms are fully resolved, you may still be contagious. We recommend that you remain isolated for 7 days minimum or 72 hours after your symptoms have completely resolved, whichever is longer. Continually monitor symptoms. Contact a medical provider if symptoms are worsening. If you have any additional questions, contact your PCP.     For additional information, please visit the Centers for Disease Control and Prevention   [email or web address]

## 2021-01-07 DIAGNOSIS — E78.2 MIXED HYPERLIPIDEMIA: ICD-10-CM

## 2021-01-07 RX ORDER — FENOFIBRATE 145 MG/1
TABLET, COATED ORAL
Qty: 30 TABLET | Refills: 0 | Status: SHIPPED | OUTPATIENT
Start: 2021-01-07 | End: 2021-03-18 | Stop reason: SDUPTHER

## 2021-03-18 DIAGNOSIS — E78.2 MIXED HYPERLIPIDEMIA: ICD-10-CM

## 2021-03-18 DIAGNOSIS — E11.8 TYPE 2 DIABETES MELLITUS WITH COMPLICATION, WITHOUT LONG-TERM CURRENT USE OF INSULIN (HCC): ICD-10-CM

## 2021-03-18 RX ORDER — LISINOPRIL 5 MG/1
2.5 TABLET ORAL DAILY
Qty: 30 TABLET | Refills: 0 | Status: SHIPPED | OUTPATIENT
Start: 2021-03-18 | End: 2021-04-02 | Stop reason: SDUPTHER

## 2021-03-18 RX ORDER — FENOFIBRATE 145 MG/1
TABLET, COATED ORAL
Qty: 30 TABLET | Refills: 0 | Status: SHIPPED | OUTPATIENT
Start: 2021-03-18 | End: 2021-04-29 | Stop reason: SDUPTHER

## 2021-04-02 DIAGNOSIS — E11.8 TYPE 2 DIABETES MELLITUS WITH COMPLICATION, WITHOUT LONG-TERM CURRENT USE OF INSULIN (HCC): ICD-10-CM

## 2021-04-02 RX ORDER — LISINOPRIL 5 MG/1
2.5 TABLET ORAL DAILY
Qty: 30 TABLET | Refills: 0 | Status: SHIPPED | OUTPATIENT
Start: 2021-04-02 | End: 2021-04-13 | Stop reason: SDUPTHER

## 2021-04-02 NOTE — TELEPHONE ENCOUNTER
Last seen: 1/28/20    Future Appointments   Date Time Provider Justyna Bauhg   4/29/2021  8:30 AM Maureen Chi MD [facility]

## 2021-04-06 ENCOUNTER — TELEPHONE (OUTPATIENT)
Dept: INTERNAL MEDICINE CLINIC | Age: 46
End: 2021-04-06

## 2021-04-06 NOTE — TELEPHONE ENCOUNTER
Future Appointments   Date Time Provider Justyna Baugh   4/29/2021  8:30 AM Jorje Anton MD [facility]    last ov 1/21/20

## 2021-04-13 DIAGNOSIS — E11.8 TYPE 2 DIABETES MELLITUS WITH COMPLICATION, WITHOUT LONG-TERM CURRENT USE OF INSULIN (HCC): ICD-10-CM

## 2021-04-13 RX ORDER — LISINOPRIL 5 MG/1
2.5 TABLET ORAL DAILY
Qty: 30 TABLET | Refills: 0 | Status: SHIPPED | OUTPATIENT
Start: 2021-04-13 | End: 2021-04-29 | Stop reason: ALTCHOICE

## 2021-04-13 NOTE — TELEPHONE ENCOUNTER
Future Appointments   Date Time Provider Justyan Baugh   4/29/2021  8:30 AM Donte Gallo MD [facility]    last ov 1/28/20

## 2021-04-28 NOTE — PROGRESS NOTES
2021    Kalie Ornelas (:  1975) is a 40 y.o. female, here for evaluation of the following medical concerns:    Chief Complaint   Patient presents with   Gaylia Bosworth Doctor     former Gisele Bradley patient    Hypertension    Hyperlipidemia    Diabetes       HPI  This is a 78-year-old female with past medical history of diabetes mellitus type 2, hypertension, hyperlipidemia, obesity, anxiety and depression who presents today to establish care and for follow-up of her chronic disease management. The patient has not been seen in the office for the last 1 year. She works at night and has not been taking her diabetes medications. Diabetes mellitus type 2  The patient has had this for 2 years she was started on Metformin and glyburide. She states Metformin gave her a lot of diarrhea and she stopped taking it. But she is not taking the glipizide either. She does not have a glucometer and does not check her blood sugars. . There are no hypoglycemic associated symptoms. Pertinent negatives for hypoglycemia, no confusion, dizziness, headaches, nervousness/anxiousness, pallor, sweats or tremors. She tells me she is current on her eye exam.  Hypertension   This is a chronic problem for 3 years. Her blood pressure is elevated today 1 4500. She is taking 2.5 mg of lisinopril. She has not been checking her blood pressure on a regular basis. She denies any chest pain shortness of breath lightheadedness leg swelling. Weight gain has not been helping her with blood pressure control. Hyperlipidemia   This is a chronic problem. The current episode started more than 1 year ago. The problem is controlled. Exacerbating diseases include diabetes and obesity. Factors aggravating her hyperlipidemia include fatty foods. Pertinent negatives include no chest pain, focal sensory loss, focal weakness, leg pain, myalgias or shortness of breath. Current antihyperlipidemic treatment includes diet change and exercise. Compliance problems include adherence to diet and adherence to exercise. Risk factors for coronary artery disease include dyslipidemia, diabetes mellitus, family history, obesity, hypertension, stress and a sedentary lifestyle. She is taking her atorvastatin on a regular basis but she is not sure about her TriCor. Anxiety/Depression   This problem is chronic and stable. Patient currently taking Wellbutrin       Review of Systems   Constitutional: Negative for activity change, appetite change and fatigue. HENT: Negative for mouth sores, nosebleeds, rhinorrhea and sinus pain. Eyes: Negative for discharge and visual disturbance. Respiratory: Negative for cough, chest tightness and shortness of breath. Cardiovascular: Negative for chest pain, palpitations and leg swelling. Gastrointestinal: Negative for abdominal pain, blood in stool, constipation, diarrhea, nausea and vomiting. Endocrine: Positive for polydipsia and polyuria. Musculoskeletal: Negative for back pain and gait problem. Skin: Negative for rash and wound. Neurological: Negative for dizziness, speech difficulty, weakness and numbness. Psychiatric/Behavioral: Negative for dysphoric mood, self-injury and suicidal ideas. The patient is not nervous/anxious. All other systems reviewed and are negative. Prior to Visit Medications    Medication Sig Taking? Authorizing Provider   norgestrel-ethinyl estradiol (CRYSELLE-28) 0.3-30 MG-MCG per tablet TAKE 1 TABLET BY MOUTH EVERY DAY AS DIRECTED WITH NO PLACEBOS Yes Historical Provider, MD   atorvastatin (LIPITOR) 20 MG tablet Take 1 tablet by mouth daily Yes Maryjane Cogan, MD   blood glucose monitor kit and supplies Test 3 times a day & as needed for symptoms of irregular blood glucose. Yes Maryjane Cogan, MD   blood glucose monitor strips Test 3 times a day & as needed for symptoms of irregular blood glucose.  Yes Maryjane Cogan, MD   Alcohol Swabs 70 % PADS Clean your skin with alcohol swabs before pricking your finger Yes Brittaney Snowden MD   Lancets MISC 1 each by Does not apply route daily Yes Brittaney Snowden MD   insulin glargine (LANTUS SOLOSTAR) 100 UNIT/ML injection pen Inject 12 Units into the skin nightly Yes Brittaney Snowden MD   glucagon 1 MG injection Inject 1 mg into the muscle See Admin Instructions Follow package directions for low blood sugar.  Yes Brittaney Snowden MD   Insulin Pen Needle 32G X 4 MM MISC 1 each by Does not apply route daily Yes Brittaney Snowden MD   amLODIPine-benazepril (LOTREL) 2.5-10 MG per capsule Take 1 capsule by mouth daily For high blood pressure Yes Brittaney Snowden MD   fenofibrate (TRICOR) 145 MG tablet TAKE 1 TABLET BY MOUTH EVERY DAY Yes Brittaney Snowden MD   calcium-vitamin D (OSCAL-500) 500-200 MG-UNIT per tablet Take 1 tablet by mouth daily Yes Historical Provider, MD   ibuprofen (ADVIL;MOTRIN) 600 MG tablet Take 1 tablet by mouth every 6 hours as needed for Pain Yes TESSA Mendes   buPROPion (WELLBUTRIN SR) 200 MG extended release tablet Take 200 mg by mouth daily  Yes Historical Provider, MD        No Known Allergies    Past Medical History:   Diagnosis Date    Anxiety     Depression     Diabetes mellitus ([facility] [address]        Past Surgical History:   Procedure Laterality Date     SECTION      CHOLECYSTECTOMY, LAPAROSCOPIC N/A 2019    LAPAROSCOPIC CHOLECYSTECTOMY WITH CHOLANGIOGRAM WITH C-ARM performed by Antwon Kaiser MD at [address]      removal to bilateral eyes     ERCP N/A 2019    ERCP SPHINCTER/PAPILLOTOMY performed by Neil Stacy MD at [address] ERCP N/A 2019    ERCP STONE REMOVAL performed by Neil Stacy MD at [address] ERCP N/A 2019    ERCP STENT INSERTION performed by Neil Stacy MD at [address] ERCP N/A 2019    ERCP STONE LITHOTRIPSY performed by Neil Stacy MD at [address] ERCP N/A 2019    ERCP STENT REMOVAL performed by Lacho Kee MD at [address] [facility]  ERCP N/A 2019    ERCP WITH BALLOON SWEEP performed by Lacho Kee MD at [facility] History     Tobacco Use    Smoking status: Former Smoker     Start date: 4/10/1989     Quit date: 2010     Years since quittin.3    Smokeless tobacco: Never Used   Substance Use Topics    Alcohol use: Not Currently     Frequency: 2-3 times a week     Comment: 10/week    Drug use: Never         Family History   Problem Relation Age of Onset    Heart Disease Mother     High Blood Pressure Mother     Kidney Disease Mother     Colon Cancer Maternal Grandmother     Cancer Paternal Grandmother     Diabetes Father        Vitals:    21 0840 21 0844   BP: (!) 144/100 (!) 142/100   Site: Right Upper Arm Right Upper Arm   Position: Sitting Sitting   Cuff Size: Large Adult Large Adult   Pulse: 104    Resp: 18    SpO2: 98%    Weight: 179 lb (81.2 kg)        Estimated body mass index is 33.82 kg/m² as calculated from the following:    Height as of 20: 5' 1\" (1.549 m). Weight as of this encounter: 179 lb (81.2 kg). Physical Exam  Vitals signs and nursing note reviewed. Constitutional:       General: She is not in acute distress. Appearance: Normal appearance. She is obese. HENT:      Head: Normocephalic and atraumatic. Right Ear: Tympanic membrane, ear canal and external ear normal.      Left Ear: Tympanic membrane, ear canal and external ear normal.      Nose: Nose normal.      Mouth/Throat:      Mouth: Mucous membranes are moist.   Eyes:      Extraocular Movements: Extraocular movements intact. Pupils: Pupils are equal, round, and reactive to light. Neck:      Musculoskeletal: Normal range of motion and neck supple. Cardiovascular:      Rate and Rhythm: Normal rate and regular rhythm. Pulses: Normal pulses. Heart sounds: Normal heart sounds.    Pulmonary:      Effort: Pulmonary effort is normal. No respiratory distress. Breath sounds: Normal breath sounds. No wheezing. Abdominal:      General: Bowel sounds are normal. There is no distension. Palpations: Abdomen is soft. Musculoskeletal: Normal range of motion. General: No swelling or tenderness. Skin:     General: Skin is warm and dry. Coloration: Skin is not jaundiced or pale. Findings: No rash. Neurological:      General: No focal deficit present. Mental Status: She is alert and oriented to person, place, and time. Sensory: No sensory deficit. Motor: No weakness. Psychiatric:         Mood and Affect: Mood normal.         Behavior: Behavior normal.         Thought Content: Thought content normal.         ASSESSMENT/PLAN:  1. Uncontrolled type 2 diabetes mellitus with hyperglycemia ([address]  The patient has been prescribed Lantus 12 units at night as a start. She has also been prescribed glucometer with supplies as well as diabetic education. Urine microalbumin was done today. Her A1c is > 11%. She has been advised to check her blood sugar at 6 PM before giving herself insulin. And to document that in the chart so that further below the regimen can be made. She has also been prescribed a glucagon pen and has been advised about using it if her blood sugar drops. 2. Essential hypertension  Started on amlodipine 2.5 enalapril 10 mg daily will address further on the next visit  - COMPREHENSIVE METABOLIC PANEL; Future  - COMPREHENSIVE METABOLIC PANEL    3. Mixed hyperlipidemia  Continue on atorvastatin TriCor prescribed  - Lipid, Fasting; Future  - atorvastatin (LIPITOR) 20 MG tablet; Take 1 tablet by mouth daily  Dispense: 90 tablet; Refill: 0  - fenofibrate (TRICOR) 145 MG tablet; TAKE 1 TABLET BY MOUTH EVERY DAY  Dispense: 30 tablet; Refill: 2    4. Class 1 obesity due to excess calories with serious comorbidity and body mass index (BMI) of 33.0 to 33.9 in adult  Counseling regarding weight loss    5. Screening for hypothyroidism    - TSH WITH REFLEX TO FT4; Future  - CBC Auto Differential; Future  - CBC Auto Differential  - TSH WITH REFLEX TO FT4    6. Incomplete immunization status    - HEPATITIS B SURFACE ANTIBODY; Future  - HEPATITIS B SURFACE ANTIGEN; Future  - HEPATITIS B SURFACE ANTIGEN  - HEPATITIS B SURFACE ANTIBODY      Orders Placed This Encounter   Medications    DISCONTD: lisinopril-hydroCHLOROthiazide (PRINZIDE;ZESTORETIC) 20-12.5 MG per tablet     Sig: Take 1 tablet by mouth daily     Dispense:  90 tablet     Refill:  1    atorvastatin (LIPITOR) 20 MG tablet     Sig: Take 1 tablet by mouth daily     Dispense:  90 tablet     Refill:  0    blood glucose monitor kit and supplies     Sig: Test 3 times a day & as needed for symptoms of irregular blood glucose. Dispense:  1 kit     Refill:  0    blood glucose monitor strips     Sig: Test 3 times a day & as needed for symptoms of irregular blood glucose. Dispense:  100 strip     Refill:  2    Alcohol Swabs 70 % PADS     Sig: Clean your skin with alcohol swabs before pricking your finger     Dispense:  100 each     Refill:  2    Lancets MISC     Si each by Does not apply route daily     Dispense:  300 each     Refill:  1    insulin glargine (LANTUS SOLOSTAR) 100 UNIT/ML injection pen     Sig: Inject 12 Units into the skin nightly     Dispense:  5 pen     Refill:  3    glucagon 1 MG injection     Sig: Inject 1 mg into the muscle See Admin Instructions Follow package directions for low blood sugar.      Dispense:  1 kit     Refill:  3    Insulin Pen Needle 32G X 4 MM MISC     Si each by Does not apply route daily     Dispense:  100 each     Refill:  3    amLODIPine-benazepril (LOTREL) 2.5-10 MG per capsule     Sig: Take 1 capsule by mouth daily For high blood pressure     Dispense:  30 capsule     Refill:  0    fenofibrate (TRICOR) 145 MG tablet     Sig: TAKE 1 TABLET BY MOUTH EVERY DAY     Dispense:  30 tablet     Refill:  2 Return in about 2 weeks (around 5/13/2021) for Physical exam.

## 2021-04-29 ENCOUNTER — OFFICE VISIT (OUTPATIENT)
Dept: INTERNAL MEDICINE CLINIC | Age: 46
End: 2021-04-29
Payer: COMMERCIAL

## 2021-04-29 VITALS
DIASTOLIC BLOOD PRESSURE: 100 MMHG | BODY MASS INDEX: 33.82 KG/M2 | WEIGHT: 179 LBS | HEART RATE: 104 BPM | OXYGEN SATURATION: 98 % | RESPIRATION RATE: 18 BRPM | SYSTOLIC BLOOD PRESSURE: 142 MMHG

## 2021-04-29 DIAGNOSIS — Z28.39 INCOMPLETE IMMUNIZATION STATUS: ICD-10-CM

## 2021-04-29 DIAGNOSIS — E11.65 UNCONTROLLED TYPE 2 DIABETES MELLITUS WITH HYPERGLYCEMIA (HCC): Primary | ICD-10-CM

## 2021-04-29 DIAGNOSIS — Z13.29 SCREENING FOR HYPOTHYROIDISM: ICD-10-CM

## 2021-04-29 DIAGNOSIS — E66.09 CLASS 1 OBESITY DUE TO EXCESS CALORIES WITH SERIOUS COMORBIDITY AND BODY MASS INDEX (BMI) OF 33.0 TO 33.9 IN ADULT: ICD-10-CM

## 2021-04-29 DIAGNOSIS — Z76.89 ESTABLISHING CARE WITH NEW DOCTOR, ENCOUNTER FOR: ICD-10-CM

## 2021-04-29 DIAGNOSIS — I10 ESSENTIAL HYPERTENSION: ICD-10-CM

## 2021-04-29 DIAGNOSIS — E78.2 MIXED HYPERLIPIDEMIA: ICD-10-CM

## 2021-04-29 PROBLEM — K80.40 CHOLEDOCHOLITHIASIS WITH CHOLECYSTITIS: Status: RESOLVED | Noted: 2019-07-01 | Resolved: 2021-04-29

## 2021-04-29 PROBLEM — D72.9 NEUTROPHILIC LEUKOCYTOSIS: Status: RESOLVED | Noted: 2019-05-29 | Resolved: 2021-04-29

## 2021-04-29 PROBLEM — K81.0 ACUTE CHOLECYSTITIS: Status: RESOLVED | Noted: 2019-07-01 | Resolved: 2021-04-29

## 2021-04-29 PROBLEM — K80.20 SYMPTOMATIC CHOLELITHIASIS: Status: RESOLVED | Noted: 2019-05-29 | Resolved: 2021-04-29

## 2021-04-29 LAB
A/G RATIO: 1.2 (ref 1.1–2.2)
ALBUMIN SERPL-MCNC: 4.4 G/DL (ref 3.4–5)
ALP BLD-CCNC: 108 U/L (ref 40–129)
ALT SERPL-CCNC: 7 U/L (ref 10–40)
ANION GAP SERPL CALCULATED.3IONS-SCNC: 15 MMOL/L (ref 3–16)
AST SERPL-CCNC: 8 U/L (ref 15–37)
BASOPHILS ABSOLUTE: 0.1 K/UL (ref 0–0.2)
BASOPHILS RELATIVE PERCENT: 0.7 %
BILIRUB SERPL-MCNC: <0.2 MG/DL (ref 0–1)
BUN BLDV-MCNC: 12 MG/DL (ref 7–20)
CALCIUM SERPL-MCNC: 10.4 MG/DL (ref 8.3–10.6)
CHLORIDE BLD-SCNC: 98 MMOL/L (ref 99–110)
CO2: 24 MMOL/L (ref 21–32)
CREAT SERPL-MCNC: 0.6 MG/DL (ref 0.6–1.1)
CREATININE URINE: 54.5 MG/DL (ref 28–259)
EOSINOPHILS ABSOLUTE: 0.1 K/UL (ref 0–0.6)
EOSINOPHILS RELATIVE PERCENT: 1.1 %
GFR AFRICAN AMERICAN: >60
GFR NON-AFRICAN AMERICAN: >60
GLOBULIN: 3.6 G/DL
GLUCOSE BLD-MCNC: 388 MG/DL (ref 70–99)
HBA1C MFR BLD: 11.3 %
HBV SURFACE AB TITR SER: <3.5 MIU/ML
HCT VFR BLD CALC: 41.8 % (ref 36–48)
HEMOGLOBIN: 13.8 G/DL (ref 12–16)
HEPATITIS B SURFACE ANTIGEN INTERPRETATION: NORMAL
LYMPHOCYTES ABSOLUTE: 3.9 K/UL (ref 1–5.1)
LYMPHOCYTES RELATIVE PERCENT: 33.8 %
MCH RBC QN AUTO: 27.8 PG (ref 26–34)
MCHC RBC AUTO-ENTMCNC: 33.1 G/DL (ref 31–36)
MCV RBC AUTO: 84.3 FL (ref 80–100)
MICROALBUMIN UR-MCNC: <1.2 MG/DL
MICROALBUMIN/CREAT UR-RTO: NORMAL MG/G (ref 0–30)
MONOCYTES ABSOLUTE: 0.7 K/UL (ref 0–1.3)
MONOCYTES RELATIVE PERCENT: 5.8 %
NEUTROPHILS ABSOLUTE: 6.7 K/UL (ref 1.7–7.7)
NEUTROPHILS RELATIVE PERCENT: 58.6 %
PDW BLD-RTO: 13.8 % (ref 12.4–15.4)
PLATELET # BLD: 511 K/UL (ref 135–450)
PMV BLD AUTO: 8.6 FL (ref 5–10.5)
POTASSIUM SERPL-SCNC: 5.1 MMOL/L (ref 3.5–5.1)
RBC # BLD: 4.96 M/UL (ref 4–5.2)
SODIUM BLD-SCNC: 137 MMOL/L (ref 136–145)
TOTAL PROTEIN: 8 G/DL (ref 6.4–8.2)
TSH REFLEX FT4: 3.04 UIU/ML (ref 0.27–4.2)
WBC # BLD: 11.4 K/UL (ref 4–11)

## 2021-04-29 PROCEDURE — G8417 CALC BMI ABV UP PARAM F/U: HCPCS | Performed by: INTERNAL MEDICINE

## 2021-04-29 PROCEDURE — 36415 COLL VENOUS BLD VENIPUNCTURE: CPT | Performed by: INTERNAL MEDICINE

## 2021-04-29 PROCEDURE — 83036 HEMOGLOBIN GLYCOSYLATED A1C: CPT | Performed by: INTERNAL MEDICINE

## 2021-04-29 PROCEDURE — G8427 DOCREV CUR MEDS BY ELIG CLIN: HCPCS | Performed by: INTERNAL MEDICINE

## 2021-04-29 PROCEDURE — 1036F TOBACCO NON-USER: CPT | Performed by: INTERNAL MEDICINE

## 2021-04-29 PROCEDURE — 2022F DILAT RTA XM EVC RTNOPTHY: CPT | Performed by: INTERNAL MEDICINE

## 2021-04-29 PROCEDURE — 99215 OFFICE O/P EST HI 40 MIN: CPT | Performed by: INTERNAL MEDICINE

## 2021-04-29 PROCEDURE — 3046F HEMOGLOBIN A1C LEVEL >9.0%: CPT | Performed by: INTERNAL MEDICINE

## 2021-04-29 RX ORDER — ATORVASTATIN CALCIUM 20 MG/1
20 TABLET, FILM COATED ORAL DAILY
Qty: 90 TABLET | Refills: 0 | Status: SHIPPED | OUTPATIENT
Start: 2021-04-29 | End: 2021-05-14 | Stop reason: ALTCHOICE

## 2021-04-29 RX ORDER — AMLODIPINE BESYLATE AND BENAZEPRIL HYDROCHLORIDE 2.5; 1 MG/1; MG/1
1 CAPSULE ORAL DAILY
Qty: 30 CAPSULE | Refills: 0 | Status: SHIPPED | OUTPATIENT
Start: 2021-04-29 | End: 2021-05-03 | Stop reason: ALTCHOICE

## 2021-04-29 RX ORDER — LISINOPRIL AND HYDROCHLOROTHIAZIDE 20; 12.5 MG/1; MG/1
1 TABLET ORAL DAILY
Qty: 90 TABLET | Refills: 1 | Status: SHIPPED | OUTPATIENT
Start: 2021-04-29 | End: 2021-04-29 | Stop reason: ALTCHOICE

## 2021-04-29 RX ORDER — INSULIN GLARGINE 100 [IU]/ML
12 INJECTION, SOLUTION SUBCUTANEOUS NIGHTLY
Qty: 5 PEN | Refills: 3 | Status: SHIPPED | OUTPATIENT
Start: 2021-04-29 | End: 2021-05-13 | Stop reason: SDUPTHER

## 2021-04-29 RX ORDER — GLUCOSAMINE HCL/CHONDROITIN SU 500-400 MG
CAPSULE ORAL
Qty: 100 EACH | Refills: 2 | Status: SHIPPED | OUTPATIENT
Start: 2021-04-29

## 2021-04-29 RX ORDER — LANCETS 30 GAUGE
1 EACH MISCELLANEOUS DAILY
Qty: 300 EACH | Refills: 1 | Status: SHIPPED | OUTPATIENT
Start: 2021-04-29

## 2021-04-29 RX ORDER — FENOFIBRATE 145 MG/1
TABLET, COATED ORAL
Qty: 30 TABLET | Refills: 2 | Status: SHIPPED | OUTPATIENT
Start: 2021-04-29 | End: 2021-09-16 | Stop reason: SDUPTHER

## 2021-04-29 RX ORDER — NORGESTREL-ETHINYL ESTRADIOL 0.3-0.03MG
TABLET ORAL
COMMUNITY
Start: 2021-03-17 | End: 2021-12-03

## 2021-04-29 RX ORDER — GLUCOSAMINE HCL/CHONDROITIN SU 500-400 MG
CAPSULE ORAL
Qty: 100 STRIP | Refills: 2 | Status: SHIPPED | OUTPATIENT
Start: 2021-04-29

## 2021-04-29 ASSESSMENT — ENCOUNTER SYMPTOMS
SHORTNESS OF BREATH: 0
CHEST TIGHTNESS: 0
NAUSEA: 0
EYE DISCHARGE: 0
COUGH: 0
CONSTIPATION: 0
BLOOD IN STOOL: 0
BACK PAIN: 0
DIARRHEA: 0
SINUS PAIN: 0
VOMITING: 0
RHINORRHEA: 0
ABDOMINAL PAIN: 0

## 2021-04-29 ASSESSMENT — PATIENT HEALTH QUESTIONNAIRE - PHQ9
1. LITTLE INTEREST OR PLEASURE IN DOING THINGS: 0
SUM OF ALL RESPONSES TO PHQ QUESTIONS 1-9: 0
SUM OF ALL RESPONSES TO PHQ QUESTIONS 1-9: 0

## 2021-05-03 RX ORDER — AMLODIPINE BESYLATE 5 MG/1
5 TABLET ORAL DAILY
Qty: 90 TABLET | Refills: 1 | Status: SHIPPED | OUTPATIENT
Start: 2021-05-03 | End: 2021-06-10 | Stop reason: ALTCHOICE

## 2021-05-12 NOTE — PROGRESS NOTES
2021    Tonio Mcmahon (:  1975) is a 40 y.o. female, here for a preventive medicine evaluation. Patient Active Problem List   Diagnosis    Calculus of bile duct with acute cholecystitis and obstruction    Benign neoplasm of skin of lower limb, including hip    Hypertrophic and atrophic condition of skin    Inflamed seborrheic keratosis    Multiple benign nevi    Essential hypertension    Uncontrolled type 2 diabetes mellitus with hyperglycemia ([address]    Class 1 obesity due to excess calories with serious comorbidity in adult    Mixed hyperlipidemia   She feels well generally. She is up-to-date on her mammogram which was ordered through her GYN at [facility] done in 2020 and was normal  She also had a Pap smear in 2020 which she tells me is also normal  I have requested her to have her records faxed over to our office to be scanned into the media section  She is up-to-date on her Covid vaccine  She needs hepatitis B vaccine and will get  her first shot today  Review of Systems   Constitutional: Negative for activity change, appetite change and fatigue. HENT: Negative for mouth sores, nosebleeds, rhinorrhea and sinus pain. Eyes: Negative for discharge and visual disturbance. Respiratory: Negative for cough, chest tightness and shortness of breath. Cardiovascular: Negative for chest pain, palpitations and leg swelling. Gastrointestinal: Negative for abdominal pain, blood in stool, constipation, diarrhea, nausea and vomiting. Musculoskeletal: Negative for back pain and gait problem. Skin: Negative for rash and wound. Neurological: Negative for dizziness, speech difficulty, weakness and numbness. Psychiatric/Behavioral: Negative for dysphoric mood, self-injury and suicidal ideas. The patient is not nervous/anxious. All other systems reviewed and are negative. Prior to Visit Medications    Medication Sig Taking?  Authorizing Provider   insulin glargine (LANTUS SOLOSTAR) 100 UNIT/ML injection pen Inject 8 Units into the skin 2 times daily Yes Kristine Caballero MD   lisinopril-hydroCHLOROthiazide (PRINZIDE;ZESTORETIC) 20-12.5 MG per tablet Take 2 tablets by mouth daily Yes Kristine Caballero MD   amLODIPine (NORVASC) 5 MG tablet Take 1 tablet by mouth daily Yes Kristine Caballero MD   norgestrel-ethinyl estradiol (CRYSELLE-28) 0.3-30 MG-MCG per tablet TAKE 1 TABLET BY MOUTH EVERY DAY AS DIRECTED WITH NO PLACEBOS Yes Historical Provider, MD   atorvastatin (LIPITOR) 20 MG tablet Take 1 tablet by mouth daily Yes Kristine Caballero MD   fenofibrate (TRICOR) 145 MG tablet TAKE 1 TABLET BY MOUTH EVERY DAY Yes Kristine Caballero MD   calcium-vitamin D (OSCAL-500) 500-200 MG-UNIT per tablet Take 1 tablet by mouth daily Yes Historical Provider, MD   ibuprofen (ADVIL;MOTRIN) 600 MG tablet Take 1 tablet by mouth every 6 hours as needed for Pain Yes TristinTESSA Arambula   buPROPion (WELLBUTRIN SR) 200 MG extended release tablet Take 200 mg by mouth daily  Yes Historical Provider, MD   blood glucose monitor kit and supplies Test 3 times a day & as needed for symptoms of irregular blood glucose. Kristine Caballero MD   blood glucose monitor strips Test 3 times a day & as needed for symptoms of irregular blood glucose. Kristine Caballero MD   Alcohol Swabs 70 % PADS Clean your skin with alcohol swabs before pricking your finger  Kristine Caballero MD   Lancets MISC 1 each by Does not apply route daily  Kristine Caballero MD   glucagon 1 MG injection Inject 1 mg into the muscle See Admin Instructions Follow package directions for low blood sugar.   Kristine Caballero MD   Insulin Pen Needle 32G X 4 MM MISC 1 each by Does not apply route daily  Kristine Caballero MD        No Known Allergies    Past Medical History:   Diagnosis Date    Anxiety     Depression     Diabetes mellitus ([facility] [address]        Past Surgical History:   Procedure Laterality Date     SECTION      CHOLECYSTECTOMY, LAPAROSCOPIC N/A 2019    LAPAROSCOPIC CHOLECYSTECTOMY WITH CHOLANGIOGRAM WITH C-ARM performed by Gilberto Hammer MD at [address]      removal to bilateral eyes     ERCP N/A 2019    ERCP SPHINCTER/PAPILLOTOMY performed by Letty Basilio MD at [address] [facility]  ERCP N/A 2019    ERCP STONE REMOVAL performed by Letty Basilio MD at [address] [facility]  ERCP N/A 2019    ERCP STENT INSERTION performed by Letty Basilio MD at [address] [facility] [address] ERCP N/A 2019    ERCP STONE LITHOTRIPSY performed by Letty Basilio MD at [address] [facility]  ERCP N/A 2019    ERCP STENT REMOVAL performed by Letty Basilio MD at [facility]  ERCP N/A 2019    ERCP WITH BALLOON SWEEP performed by Letty Basilio MD at [address] [place] [address] Marital status: Single     Spouse name: n/a    Number of children: 1    Years of education: 12    Highest education level: High school graduate   Occupational History    Occupation: surveillence    Social Needs    Financial resource strain: Not hard at all   [address] insecurity     Worry: Never true     Inability: Never true   b[place] needs     Medical: No     Non-medical: No   Tobacco Use    Smoking status: Former Smoker     Start date: 4/10/1989     Quit date: 2010     Years since quittin.3    Smokeless tobacco: Never Used   Substance and Sexual Activity    Alcohol use: Not Currently     Frequency: 2-3 times a week     Comment: 10/week    Drug use: Never    Sexual activity: Yes     Birth control/protection: Pill   Lifestyle    Physical activity     Days per week: Not on file     Minutes per session: Not on file    Stress: Not on file   Relationships    Social connections     Talks on phone: Not on file     Gets together: Not on file     Attends [employer] service: Not on file     Active member of club or organization: Not on file     Attends meetings of clubs or organizations: Not on file     Relationship status: Not on file    Intimate partner violence     Fear of current or ex partner: Not on file     Emotionally abused: Not on file     Physically abused: Not on file     Forced sexual activity: Not on file   Other Topics Concern    Not on file   Social History Narrative    Not on file        Family History   Problem Relation Age of Onset    Heart Disease Mother     High Blood Pressure Mother     Kidney Disease Mother     Colon Cancer Maternal Grandmother     Cancer Paternal Grandmother     Diabetes Father        ADVANCE DIRECTIVE: N, <no information>    Vitals:    05/13/21 0834   BP: (!) 136/96   Pulse: 107   Resp: 14   SpO2: 98%   Weight: 174 lb 6.4 oz (79.1 kg)   Height: 5' 1\" (1.549 m)     Estimated body mass index is 32.95 kg/m² as calculated from the following:    Height as of this encounter: 5' 1\" (1.549 m). Weight as of this encounter: 174 lb 6.4 oz (79.1 kg). Physical Exam  Vitals signs and nursing note reviewed. Constitutional:       General: She is not in acute distress. Appearance: Normal appearance. She is normal weight. HENT:      Head: Normocephalic and atraumatic. Right Ear: Tympanic membrane, ear canal and external ear normal.      Left Ear: Tympanic membrane, ear canal and external ear normal.      Nose: Nose normal.      Mouth/Throat:      Mouth: Mucous membranes are moist.   Eyes:      Extraocular Movements: Extraocular movements intact. Pupils: Pupils are equal, round, and reactive to light. Neck:      Musculoskeletal: Normal range of motion and neck supple. Cardiovascular:      Rate and Rhythm: Normal rate and regular rhythm. Pulses: Normal pulses. Heart sounds: Normal heart sounds. Pulmonary:      Effort: Pulmonary effort is normal. No respiratory distress. Breath sounds: Normal breath sounds. No wheezing. Abdominal:      General: Bowel sounds are normal. There is no distension. Palpations: Abdomen is soft. Musculoskeletal: Normal range of motion. General: No swelling or tenderness. Skin:     General: Skin is warm and dry. Coloration: Skin is not jaundiced or pale. Findings: No rash. Neurological:      General: No focal deficit present. Mental Status: She is alert and oriented to person, place, and time. Sensory: No sensory deficit. Motor: No weakness. Psychiatric:         Mood and Affect: Mood normal.         Behavior: Behavior normal.         Thought Content: Thought content normal.         No flowsheet data found.     Lab Results   Component Value Date    CHOL 170 10/18/2019    CHOL 175 04/17/2019    TRIG 111 10/18/2019    TRIG 246 04/17/2019    HDL 41 10/18/2019    HDL 30 04/17/2019    LDLCALC 107 10/18/2019    LDLCALC 96 04/17/2019    GLUCOSE 388 04/29/2021    LABA1C 11.3 04/29/2021    LABA1C 10.9 01/21/2020    LABA1C 9.1 10/18/2019       The 10-year ASCVD risk score (Jaimie De La Torre, et al., 2013) is: 2.9%    Values used to calculate the score:      Age: 40 years      Sex: Female      Is Non- : No      Diabetic: Yes      Tobacco smoker: No      Systolic Blood Pressure: 084 mmHg      Is BP treated: Yes      HDL Cholesterol: 41 mg/dL      Total Cholesterol: 170 mg/dL    Immunization History   Administered Date(s) Administered    COVID-19, Pfizer, PF, 30mcg/0.3mL 03/22/2021, 04/12/2021    Pneumococcal Conjugate 13-valent ([id number]) 01/21/2020    Tdap (Boostrix, Adacel) 01/21/2020       Health Maintenance   Topic Date Due    Hepatitis B vaccine (1 of 3 - Risk 3-dose series) Never done    Pneumococcal 0-64 years Vaccine (1 of 1 - PPSV23) 03/17/2020    Diabetic retinal exam  04/17/2020    Cervical cancer screen  07/12/2020    Lipid screen  10/18/2020    Hepatitis C screen  04/29/2022 (Originally 1975)    HIV screen  04/29/2022 (Originally 4/1/1990)    A1C test (Diabetic or Prediabetic)  07/29/2021    Flu vaccine (Season Ended) 09/01/2021    Diabetic microalbuminuria test  04/29/2022    Potassium monitoring  04/29/2022    Creatinine monitoring  04/29/2022    Diabetic foot exam  05/13/2022    DTaP/Tdap/Td vaccine (2 - Td) 01/21/2030    COVID-19 Vaccine  Completed    Hepatitis A vaccine  Aged Out    Hib vaccine  Aged Out    Meningococcal (ACWY) vaccine  Aged Out       ASSESSMENT/PLAN:  1. Encounter for preventive care   She is up-to-date on her Covid vaccination. 2. Uncontrolled type 2 diabetes mellitus with hyperglycemia (HCC)  -      DIABETES FOOT EXAM  -     insulin glargine (LANTUS SOLOSTAR) 100 UNIT/ML injection pen; Inject 8 Units into the skin 2 times daily, Disp-5 pen, R-3Normal  3. Mixed hyperlipidemia  -     Lipid, Fasting; Future  4. Essential hypertension  5. Class 1 obesity due to excess calories with serious comorbidity and body mass index (BMI) of 33.0 to 33.9 in adult      Return in about 4 weeks (around 6/10/2021) for diabetes, htn. An electronic signature was used to authenticate this note.     --Anai Nelson MD on 5/13/2021 at 9:07 AM

## 2021-05-13 ENCOUNTER — OFFICE VISIT (OUTPATIENT)
Dept: INTERNAL MEDICINE CLINIC | Age: 46
End: 2021-05-13
Payer: COMMERCIAL

## 2021-05-13 VITALS
DIASTOLIC BLOOD PRESSURE: 96 MMHG | OXYGEN SATURATION: 98 % | SYSTOLIC BLOOD PRESSURE: 136 MMHG | BODY MASS INDEX: 32.93 KG/M2 | HEART RATE: 107 BPM | RESPIRATION RATE: 14 BRPM | HEIGHT: 61 IN | WEIGHT: 174.4 LBS

## 2021-05-13 DIAGNOSIS — Z00.00 ENCOUNTER FOR PREVENTIVE CARE: ICD-10-CM

## 2021-05-13 DIAGNOSIS — I10 ESSENTIAL HYPERTENSION: ICD-10-CM

## 2021-05-13 DIAGNOSIS — E78.2 MIXED HYPERLIPIDEMIA: ICD-10-CM

## 2021-05-13 DIAGNOSIS — E66.09 CLASS 1 OBESITY DUE TO EXCESS CALORIES WITH SERIOUS COMORBIDITY AND BODY MASS INDEX (BMI) OF 33.0 TO 33.9 IN ADULT: ICD-10-CM

## 2021-05-13 DIAGNOSIS — E11.65 UNCONTROLLED TYPE 2 DIABETES MELLITUS WITH HYPERGLYCEMIA (HCC): Primary | ICD-10-CM

## 2021-05-13 LAB
CHOLESTEROL, FASTING: 207 MG/DL (ref 0–199)
HDLC SERPL-MCNC: 37 MG/DL (ref 40–60)
LDL CHOLESTEROL CALCULATED: 130 MG/DL
TRIGLYCERIDE, FASTING: 201 MG/DL (ref 0–150)
VLDLC SERPL CALC-MCNC: 40 MG/DL

## 2021-05-13 PROCEDURE — 1036F TOBACCO NON-USER: CPT | Performed by: INTERNAL MEDICINE

## 2021-05-13 PROCEDURE — 90471 IMMUNIZATION ADMIN: CPT | Performed by: INTERNAL MEDICINE

## 2021-05-13 PROCEDURE — G8427 DOCREV CUR MEDS BY ELIG CLIN: HCPCS | Performed by: INTERNAL MEDICINE

## 2021-05-13 PROCEDURE — 3046F HEMOGLOBIN A1C LEVEL >9.0%: CPT | Performed by: INTERNAL MEDICINE

## 2021-05-13 PROCEDURE — 90746 HEPB VACCINE 3 DOSE ADULT IM: CPT | Performed by: INTERNAL MEDICINE

## 2021-05-13 PROCEDURE — 2022F DILAT RTA XM EVC RTNOPTHY: CPT | Performed by: INTERNAL MEDICINE

## 2021-05-13 PROCEDURE — 99396 PREV VISIT EST AGE 40-64: CPT | Performed by: INTERNAL MEDICINE

## 2021-05-13 PROCEDURE — G8417 CALC BMI ABV UP PARAM F/U: HCPCS | Performed by: INTERNAL MEDICINE

## 2021-05-13 PROCEDURE — 36415 COLL VENOUS BLD VENIPUNCTURE: CPT | Performed by: INTERNAL MEDICINE

## 2021-05-13 PROCEDURE — 99213 OFFICE O/P EST LOW 20 MIN: CPT | Performed by: INTERNAL MEDICINE

## 2021-05-13 RX ORDER — LISINOPRIL AND HYDROCHLOROTHIAZIDE 20; 12.5 MG/1; MG/1
2 TABLET ORAL DAILY
Qty: 180 TABLET | Refills: 1 | Status: SHIPPED | OUTPATIENT
Start: 2021-05-13 | End: 2021-12-03

## 2021-05-13 RX ORDER — INSULIN GLARGINE 100 [IU]/ML
8 INJECTION, SOLUTION SUBCUTANEOUS 2 TIMES DAILY
Qty: 5 PEN | Refills: 3 | Status: SHIPPED | OUTPATIENT
Start: 2021-05-13 | End: 2021-06-10 | Stop reason: SDUPTHER

## 2021-05-13 ASSESSMENT — ENCOUNTER SYMPTOMS
COUGH: 0
BLOOD IN STOOL: 0
SINUS PAIN: 0
CHEST TIGHTNESS: 0
NAUSEA: 0
EYE DISCHARGE: 0
DIARRHEA: 0
CONSTIPATION: 0
RHINORRHEA: 0
ABDOMINAL PAIN: 0
BACK PAIN: 0
VOMITING: 0
SHORTNESS OF BREATH: 0

## 2021-05-13 NOTE — PROGRESS NOTES
2021    Marcelle Mixon (:  1975) is a 40 y.o. female, here for evaluation of the following medical concerns:    Chief Complaint   Patient presents with    Check-Up     physical       HPI  This is a 76-year-old female with past medical history of diabetes mellitus type 2, hypertension, hyperlipidemia, obesity, anxiety and depression who presents today to establish care and for follow-up of her chronic disease management. The patient has not been seen in the office for the last 1 year. She works at night and has not been taking her diabetes medications. Diabetes mellitus type 2  The patient has had this for 2 years she was started on Metformin and glyburide. She states Metformin gave her a lot of diarrhea and she stopped taking it but she is was taking the glipizide either. She does not have a glucometer and does not check her blood sugars. he was started on Lantus 12 units at night. She did have an episode where she felt sweaty and feels that she might have dropped her blood sugar. Blood sugars have been running in 200s range still. She is also trying to lose weight  She tells me she is current on her eye exam.  Hypertension   This is a chronic problem for 3 years. Her blood pressure is elevated today . She is taking 2.5 mg of lisinopril. She has not been checking her blood pressure on a regular basis. She denies any chest pain shortness of breath lightheadedness leg swelling. Weight gain has not been helping her with blood pressure control. Hyperlipidemia   This is a chronic problem. The current episode started more than 1 year ago. The problem is controlled. Exacerbating diseases include diabetes and obesity. Factors aggravating her hyperlipidemia include fatty foods. Pertinent negatives include no chest pain, focal sensory loss, focal weakness, leg pain, myalgias or shortness of breath. Current antihyperlipidemic treatment includes diet change and exercise.  Compliance problems include TABLET BY MOUTH EVERY DAY Yes Hernesto Ochoa MD   calcium-vitamin D (OSCAL-500) 500-200 MG-UNIT per tablet Take 1 tablet by mouth daily Yes Historical Provider, MD   ibuprofen (ADVIL;MOTRIN) 600 MG tablet Take 1 tablet by mouth every 6 hours as needed for Pain Yes TESSA Vanessa   buPROPion (WELLBUTRIN SR) 200 MG extended release tablet Take 200 mg by mouth daily  Yes Historical Provider, MD   blood glucose monitor kit and supplies Test 3 times a day & as needed for symptoms of irregular blood glucose. Hernesto Ochoa MD   blood glucose monitor strips Test 3 times a day & as needed for symptoms of irregular blood glucose. Hernesto Ochoa MD   Alcohol Swabs 70 % PADS Clean your skin with alcohol swabs before pricking your finger  Hernesto Ochoa MD   Lancets MISC 1 each by Does not apply route daily  Hernesto Ochoa MD   glucagon 1 MG injection Inject 1 mg into the muscle See Admin Instructions Follow package directions for low blood sugar.   Hernesto Ochoa MD   Insulin Pen Needle 32G X 4 MM MISC 1 each by Does not apply route daily  Hernesto Ochoa MD        No Known Allergies    Past Medical History:   Diagnosis Date    Anxiety     Depression     Diabetes mellitus ([facility] [address]        Past Surgical History:   Procedure Laterality Date     SECTION      CHOLECYSTECTOMY, LAPAROSCOPIC N/A 2019    LAPAROSCOPIC CHOLECYSTECTOMY WITH CHOLANGIOGRAM WITH C-ARM performed by Niya Odell MD at [address]      removal to bilateral eyes     ERCP N/A 2019    ERCP SPHINCTER/PAPILLOTOMY performed by Lacho Kee MD at [address] ERCP N/A 2019    ERCP STONE REMOVAL performed by Lacho Kee MD at [address] ERCP N/A 2019    ERCP STENT INSERTION performed by Lacho Kee MD at [address] ERCP N/A 2019    ERCP STONE LITHOTRIPSY performed by Lacho Kee MD at [address] ERCP N/A 2019    ERCP STENT REMOVAL performed by Kemal Zurita Lillian Murillo MD at [address] [facility]  ERCP N/A 2019    ERCP WITH BALLOON SWEEP performed by Reyes Marino MD at [address] [facility] History     Tobacco Use    Smoking status: Former Smoker     Start date: 4/10/1989     Quit date: 2010     Years since quittin.3    Smokeless tobacco: Never Used   Substance Use Topics    Alcohol use: Not Currently     Frequency: 2-3 times a week     Comment: 10/week    Drug use: Never         Family History   Problem Relation Age of Onset    Heart Disease Mother     High Blood Pressure Mother     Kidney Disease Mother     Colon Cancer Maternal Grandmother     Cancer Paternal Grandmother     Diabetes Father        Vitals:    21 0834   BP: (!) 136/96   Pulse: 107   Resp: 14   SpO2: 98%   Weight: 174 lb 6.4 oz (79.1 kg)   Height: 5' 1\" (1.549 m)       Estimated body mass index is 32.95 kg/m² as calculated from the following:    Height as of this encounter: 5' 1\" (1.549 m). Weight as of this encounter: 174 lb 6.4 oz (79.1 kg). Physical Exam  Vitals signs and nursing note reviewed. Constitutional:       General: She is not in acute distress. Appearance: Normal appearance. She is normal weight. HENT:      Head: Normocephalic and atraumatic. Right Ear: Tympanic membrane, ear canal and external ear normal.      Left Ear: Tympanic membrane, ear canal and external ear normal.      Nose: Nose normal.      Mouth/Throat:      Mouth: Mucous membranes are moist.   Eyes:      Extraocular Movements: Extraocular movements intact. Pupils: Pupils are equal, round, and reactive to light. Neck:      Musculoskeletal: Normal range of motion and neck supple. Cardiovascular:      Rate and Rhythm: Normal rate and regular rhythm. Pulses: Normal pulses. Heart sounds: Normal heart sounds. Pulmonary:      Effort: Pulmonary effort is normal. No respiratory distress. Breath sounds: Normal breath sounds. No wheezing.    Abdominal: General: Bowel sounds are normal. There is no distension. Palpations: Abdomen is soft. Musculoskeletal: Normal range of motion. General: No swelling or tenderness. Skin:     General: Skin is warm and dry. Coloration: Skin is not jaundiced or pale. Findings: No rash. Neurological:      General: No focal deficit present. Mental Status: She is alert and oriented to person, place, and time. Sensory: No sensory deficit. Motor: No weakness. Psychiatric:         Mood and Affect: Mood normal.         Behavior: Behavior normal.         Thought Content: Thought content normal.       Visual inspection:  Deformity/amputation: absent  Skin lesions/pre-ulcerative calluses: absent  Edema: right- negative, left- negative    Sensory exam:  Monofilament sensation: normal  (minimum of 5 random plantar locations tested, avoiding callused areas - > 1 area with absence of sensation is + for neuropathy)    Plus at least one of the following:  Pulses: normal,   Pinprick: Intact  Proprioception: Intact  Vibration (128 Hz): Intact    ASSESSMENT/PLAN:  1. Uncontrolled type 2 diabetes mellitus with hyperglycemia (HCC)  Lantus changed to 8 units twice daily  -  DIABETES FOOT EXAM  - insulin glargine (LANTUS SOLOSTAR) 100 UNIT/ML injection pen; Inject 8 Units into the skin 2 times daily  Dispense: 5 pen; Refill: 3    2. Mixed hyperlipidemia  Remains on atorvastatin and TriCor  - Lipid, Fasting; Future    3. Essential hypertension  Change to lisinopril 20/HCTZ 12.5 in addition to amlodipine 5  4.  Class 1 obesity due to excess calories with serious comorbidity and body mass index (BMI) of 33.0 to 33.9 in adult  Lost 5 pounds of weight encouraged to continue losing weight        Orders Placed This Encounter   Medications    insulin glargine (LANTUS SOLOSTAR) 100 UNIT/ML injection pen     Sig: Inject 8 Units into the skin 2 times daily     Dispense:  5 pen     Refill:  3    lisinopril-hydroCHLOROthiazide (PRINZIDE;ZESTORETIC) 20-12.5 MG per tablet     Sig: Take 2 tablets by mouth daily     Dispense:  180 tablet     Refill:  1       Return in about 4 weeks (around 6/10/2021) for diabetes, htn.

## 2021-05-14 RX ORDER — ATORVASTATIN CALCIUM 40 MG/1
40 TABLET, FILM COATED ORAL DAILY
Qty: 90 TABLET | Refills: 1 | Status: SHIPPED | OUTPATIENT
Start: 2021-05-14 | End: 2021-09-03 | Stop reason: SDUPTHER

## 2021-05-29 DIAGNOSIS — I10 ESSENTIAL HYPERTENSION: ICD-10-CM

## 2021-06-01 RX ORDER — AMLODIPINE BESYLATE AND BENAZEPRIL HYDROCHLORIDE 2.5; 1 MG/1; MG/1
1 CAPSULE ORAL DAILY
Qty: 30 CAPSULE | Refills: 1 | Status: SHIPPED | OUTPATIENT
Start: 2021-06-01 | End: 2021-11-09 | Stop reason: SDUPTHER

## 2021-06-10 ENCOUNTER — OFFICE VISIT (OUTPATIENT)
Dept: INTERNAL MEDICINE CLINIC | Age: 46
End: 2021-06-10
Payer: COMMERCIAL

## 2021-06-10 VITALS
HEIGHT: 61 IN | DIASTOLIC BLOOD PRESSURE: 80 MMHG | RESPIRATION RATE: 16 BRPM | HEART RATE: 106 BPM | BODY MASS INDEX: 33.42 KG/M2 | WEIGHT: 177 LBS | SYSTOLIC BLOOD PRESSURE: 132 MMHG | OXYGEN SATURATION: 93 %

## 2021-06-10 DIAGNOSIS — E66.09 CLASS 1 OBESITY DUE TO EXCESS CALORIES WITH SERIOUS COMORBIDITY AND BODY MASS INDEX (BMI) OF 33.0 TO 33.9 IN ADULT: ICD-10-CM

## 2021-06-10 DIAGNOSIS — I10 ESSENTIAL HYPERTENSION: ICD-10-CM

## 2021-06-10 DIAGNOSIS — E11.65 UNCONTROLLED TYPE 2 DIABETES MELLITUS WITH HYPERGLYCEMIA (HCC): Primary | ICD-10-CM

## 2021-06-10 DIAGNOSIS — E78.2 MIXED HYPERLIPIDEMIA: ICD-10-CM

## 2021-06-10 LAB — HBA1C MFR BLD: 10.5 %

## 2021-06-10 PROCEDURE — G8427 DOCREV CUR MEDS BY ELIG CLIN: HCPCS | Performed by: INTERNAL MEDICINE

## 2021-06-10 PROCEDURE — 99214 OFFICE O/P EST MOD 30 MIN: CPT | Performed by: INTERNAL MEDICINE

## 2021-06-10 PROCEDURE — 2022F DILAT RTA XM EVC RTNOPTHY: CPT | Performed by: INTERNAL MEDICINE

## 2021-06-10 PROCEDURE — 3046F HEMOGLOBIN A1C LEVEL >9.0%: CPT | Performed by: INTERNAL MEDICINE

## 2021-06-10 PROCEDURE — G8417 CALC BMI ABV UP PARAM F/U: HCPCS | Performed by: INTERNAL MEDICINE

## 2021-06-10 PROCEDURE — 1036F TOBACCO NON-USER: CPT | Performed by: INTERNAL MEDICINE

## 2021-06-10 PROCEDURE — 83036 HEMOGLOBIN GLYCOSYLATED A1C: CPT | Performed by: INTERNAL MEDICINE

## 2021-06-10 RX ORDER — INSULIN GLARGINE 100 [IU]/ML
INJECTION, SOLUTION SUBCUTANEOUS
Qty: 5 PEN | Refills: 3
Start: 2021-06-10

## 2021-06-10 SDOH — ECONOMIC STABILITY: FOOD INSECURITY: WITHIN THE PAST 12 MONTHS, YOU WORRIED THAT YOUR FOOD WOULD RUN OUT BEFORE YOU GOT MONEY TO BUY MORE.: NEVER TRUE

## 2021-06-10 SDOH — ECONOMIC STABILITY: FOOD INSECURITY: WITHIN THE PAST 12 MONTHS, THE FOOD YOU BOUGHT JUST DIDN'T LAST AND YOU DIDN'T HAVE MONEY TO GET MORE.: NEVER TRUE

## 2021-06-10 ASSESSMENT — ENCOUNTER SYMPTOMS
EYE DISCHARGE: 0
VOMITING: 0
ABDOMINAL PAIN: 0
CHEST TIGHTNESS: 0
RHINORRHEA: 0
DIARRHEA: 0
CONSTIPATION: 0
COUGH: 0
SHORTNESS OF BREATH: 0
SINUS PAIN: 0
BLOOD IN STOOL: 0
BACK PAIN: 0
NAUSEA: 0

## 2021-06-10 ASSESSMENT — SOCIAL DETERMINANTS OF HEALTH (SDOH): HOW HARD IS IT FOR YOU TO PAY FOR THE VERY BASICS LIKE FOOD, HOUSING, MEDICAL CARE, AND HEATING?: NOT VERY HARD

## 2021-06-10 NOTE — PROGRESS NOTES
6/10/2021    Maurisio Siegel (:  1975) is a 40 y.o. female, here for evaluation of the following medical concerns:    Chief Complaint   Patient presents with    Diabetes    Hypertension       HPI  This is a 73-year-old female with past medical history of diabetes mellitus type 2, hypertension, hyperlipidemia, obesity, anxiety and depression who presents today to establish care and for follow-up of her chronic disease management. The patient has not been seen in the office for the last 1 year. She works at night and has not been taking her diabetes medications. Diabetes mellitus type 2  The patient has had this for 2 years she was started on Metformin and glyburide. She states Metformin gave her a lot of diarrhea and she stopped taking it but she is was taking the glipizide either. She does not have a glucometer and does not check her blood sugars. Or hemoglobin A1c is down to 10.5%. She has not had any hypoglycemic episodes. She usually eats during the night and sleeps during the day because of work. She does have an appointment with a diabetic counselor in July. Hypertension   This is a chronic problem for 3 years. .  She is on blood pressure medications but is not sure which ones she is taking. Her blood pressure is better but still not at goal.She has not been checking her blood pressure on a regular basis. She denies any chest pain shortness of breath lightheadedness leg swelling. Weight gain has not been helping her with blood pressure control. Since she is not sure which medication she is taking I have asked her to call the office back with a list of her medications so that we can make further adjustments to her blood pressure meds  Hyperlipidemia   This is a chronic problem. The current episode started more than 1 year ago. The problem is controlled. Exacerbating diseases include diabetes and obesity. Factors aggravating her hyperlipidemia include fatty foods.  Pertinent negatives include no chest pain, focal sensory loss, focal weakness, leg pain, myalgias or shortness of breath. Current antihyperlipidemic treatment includes diet change and exercise and atorvastatin compliance problems include adherence to diet and adherence to exercise. Risk factors for coronary artery disease include dyslipidemia, diabetes mellitus, family history, obesity, hypertension, stress and a sedentary lifestyle. She is taking her atorvastatin on a regular basis but she is not sure about her TriCor. Anxiety/Depression   This problem is chronic and stable. Patient currently taking Wellbutrin          Review of Systems   Constitutional: Negative for activity change, appetite change and fatigue. HENT: Negative for mouth sores, nosebleeds, rhinorrhea and sinus pain. Eyes: Negative for discharge and visual disturbance. Respiratory: Negative for cough, chest tightness and shortness of breath. Cardiovascular: Negative for chest pain, palpitations and leg swelling. Gastrointestinal: Negative for abdominal pain, blood in stool, constipation, diarrhea, nausea and vomiting. Musculoskeletal: Negative for back pain and gait problem. Skin: Negative for rash and wound. Neurological: Negative for dizziness, speech difficulty, weakness and numbness. Psychiatric/Behavioral: Negative for dysphoric mood, self-injury and suicidal ideas. The patient is not nervous/anxious. All other systems reviewed and are negative. Prior to Visit Medications    Medication Sig Taking?  Authorizing Provider   insulin glargine (LANTUS SOLOSTAR) 100 UNIT/ML injection pen Take 10 units in the morning and 12 units at night Yes Rico Hall MD   amLODIPine-benazepril (LOTREL) 2.5-10 MG per capsule TAKE 1 CAPSULE BY MOUTH DAILY FOR HIGH BLOOD PRESSURE Yes Rico Hall MD   atorvastatin (LIPITOR) 40 MG tablet Take 1 tablet by mouth daily Yes Rico Hall MD   lisinopril-hydroCHLOROthiazide [facility]) Christian Pinon MD at [address] [facility]  ERCP N/A 2019    ERCP STENT INSERTION performed by Christian Pinon MD at [address] [facility]  ERCP N/A 2019    ERCP STONE LITHOTRIPSY performed by Christian Pinon MD at [address] [facility]  ERCP N/A 2019    ERCP STENT REMOVAL performed by Christian Pinon MD at [address] [facility] [address] ERCP N/A 2019    ERCP WITH BALLOON SWEEP performed by Christian Pinon MD at [address] [facility] History     Tobacco Use    Smoking status: Former Smoker     Start date: 4/10/1989     Quit date: 2010     Years since quittin.4    Smokeless tobacco: Never Used   Substance Use Topics    Alcohol use: Not Currently     Comment: 10/week    Drug use: Never         Family History   Problem Relation Age of Onset    Heart Disease Mother     High Blood Pressure Mother     Kidney Disease Mother     Colon Cancer Maternal Grandmother     Cancer Paternal Grandmother     Diabetes Father        Vitals:    06/10/21 0804   BP: 132/80   Site: Left Upper Arm   Position: Sitting   Cuff Size: Medium Adult   Pulse: 106   Resp: 16   SpO2: 93%   Weight: 177 lb (80.3 kg)   Height: 5' 1\" (1.549 m)       Estimated body mass index is 33.44 kg/m² as calculated from the following:    Height as of this encounter: 5' 1\" (1.549 m). Weight as of this encounter: 177 lb (80.3 kg). Physical Exam  Vitals and nursing note reviewed. Constitutional:       General: She is not in acute distress. Appearance: Normal appearance. She is obese. HENT:      Head: Normocephalic and atraumatic. Right Ear: Tympanic membrane, ear canal and external ear normal.      Left Ear: Tympanic membrane, ear canal and external ear normal.      Nose: Nose normal.      Mouth/Throat:      Mouth: Mucous membranes are moist.   Eyes:      Extraocular Movements: Extraocular movements intact. Pupils: Pupils are equal, round, and reactive to light.    Cardiovascular:      Rate and Rhythm: Normal rate and regular rhythm. Pulses: Normal pulses. Heart sounds: Normal heart sounds. Pulmonary:      Effort: Pulmonary effort is normal. No respiratory distress. Breath sounds: Normal breath sounds. No wheezing. Abdominal:      General: Bowel sounds are normal. There is no distension. Palpations: Abdomen is soft. Musculoskeletal:         General: No swelling or tenderness. Normal range of motion. Cervical back: Normal range of motion and neck supple. Skin:     General: Skin is warm and dry. Coloration: Skin is not jaundiced or pale. Findings: No rash. Neurological:      General: No focal deficit present. Mental Status: She is alert and oriented to person, place, and time. Sensory: No sensory deficit. Motor: No weakness. Psychiatric:         Mood and Affect: Mood normal.         Behavior: Behavior normal.         Thought Content: Thought content normal.         ASSESSMENT/PLAN:  1. Uncontrolled type 2 diabetes mellitus with hyperglycemia ([address]  I have increased her Lantus to 10 units in the morning and 12 units at night. I have asked her to check her blood sugars and to call the office in 3 to 4 days with the results. I have also told her to let us know of any hypoglycemic episodes. - POCT glycosylated hemoglobin (Hb A1C)  - insulin glargine (LANTUS SOLOSTAR) 100 UNIT/ML injection pen; Take 10 units in the morning and 12 units at night  Dispense: 5 pen; Refill: 3    2. Essential hypertension  I will wait for her to call us with a list of the blood pressure medications. That she is taking so that we can change them and address them further    3. Mixed hyperlipidemia  Continue on the atorvastatin    4.  Class 1 obesity due to excess calories with serious comorbidity and body mass index (BMI) of 33.0 to 33.9 in adult  Counseling      Orders Placed This Encounter   Medications    insulin glargine (LANTUS SOLOSTAR) 100 UNIT/ML injection pen     Sig: Take 10 units in

## 2021-07-07 ENCOUNTER — OFFICE VISIT (OUTPATIENT)
Dept: ENDOCRINOLOGY | Age: 46
End: 2021-07-07
Payer: COMMERCIAL

## 2021-07-07 DIAGNOSIS — E11.65 UNCONTROLLED TYPE 2 DIABETES MELLITUS WITH HYPERGLYCEMIA (HCC): ICD-10-CM

## 2021-07-07 PROCEDURE — 97802 MEDICAL NUTRITION INDIV IN: CPT | Performed by: DIETITIAN, REGISTERED

## 2021-07-07 NOTE — PROGRESS NOTES
Medical Nutrition Therapy for Diabetes    Hussain Bedoya  July 7, 2021      Patient Care Team:  Shola Coulter MD as PCP - Spencer Armando MD as PCP - [facility] Empaneled Provider    Reason for visit: uncontrolled, Type 2 Diabetes    ASSESSMENT/PLAN:   NUTRITION DIAGNOSIS  Initial Visit    #1 Problem: Altered Nutrition-Related Laboratory Values (NC-2.2)  Related to: Endocrine/Diabetes   As Evidenced by: Elevated Plasma glucose and/or HgbA1c levels         #2 Problem: Inconsistent Carbohydrate and Fiber Intake  Related to: Food- and nutrition-related knowledge deficit concerning appropriate amount of carbohydrate and fiber intake  As evidenced by: patient food recall      #3 Problem: Knowledge and Beliefs-NB-3.1                         Food and Nutrition deficits    NUTRITION INTERVENTION  Nutrition Prescription: 30 grams carbohydrate per meal with protein and non-starch vegetables  15 gram carbohydrate snacks    Diabetes Education/Counseling included: Pathophysiology of Diabetes  Carbohydrate Control, Activity/exercise, Label-reading, Monitoring, Medications, Hypoyglycemia prevention/treatment, Insulin management and other: benefits of adequate hydration, quality sleep and stress management. Discussed strategy for alleviating GI side effects with Metformin. Explained how to begin taking Lantus 1 time daily and keeping  dose as prescribed. Interventions:  Control Carbohydrate Intake using Plate Guide, Control Carbohydrate Intake using Carb Counting and Increase activity level by walking, space meals 4-5 hours apart and snacks 2-3 hours after meals. Suggested consult with PCP about attempting Metformin (or Metformin ER) instead of Lantus. Encouraged taking Metformin with a meal and increase water. Recommended 2 meals and snacks while working nights.   Handouts: Healthy Eating Guidelines for Diabetes-[employer], Sample menus-[employer], Planning Healthy Meals-[employer]  NUTRITION MONITORING AND calcium-vitamin D (OSCAL-500) 500-200 MG-UNIT per tablet Take 1 tablet by mouth daily      ibuprofen (ADVIL;MOTRIN) 600 MG tablet Take 1 tablet by mouth every 6 hours as needed for Pain 20 tablet 0    buPROPion (WELLBUTRIN SR) 200 MG extended release tablet Take 200 mg by mouth daily        No current facility-administered medications for this visit. NUTRITION ASSESSMENT    Biochemical Data:    Lab Results   Component Value Date    LABA1C 10.5 06/10/2021     No results found for: EAG    Lab Results   Component Value Date    CHOL 170 10/18/2019    CHOL 175 04/17/2019     Lab Results   Component Value Date    TRIG 111 10/18/2019    TRIG 246 (H) 04/17/2019     Lab Results   Component Value Date    HDL 37 (L) 05/13/2021    HDL 41 10/18/2019    HDL 30 (L) 04/17/2019     Lab Results   Component Value Date    LDLCALC 130 (H) 05/13/2021    LDLCALC 107 (H) 10/18/2019    LDLCALC 96 04/17/2019     Lab Results   Component Value Date    LABVLDL 40 05/13/2021    LABVLDL 22 10/18/2019    LABVLDL 49 04/17/2019     No results found for: [facility]    Lab Results   Component Value Date    WBC 11.4 (H) 04/29/2021    HGB 13.8 04/29/2021    HCT 41.8 04/29/2021    MCV 84.3 04/29/2021     (H) 04/29/2021       Lab Results   Component Value Date    CREATININE 0.6 04/29/2021    BUN 12 04/29/2021     04/29/2021    K 5.1 04/29/2021    CL 98 (L) 04/29/2021    CO2 24 04/29/2021       Diabetes Medications: Yes  Knows name and dose of prescribed medications Yes  Knows prescribed schedule for medicationsYes  Recent change in medication type/dosage: Yes  Stores  medications properlyYes  Comments:     Monitoring:   Has BG meter: Yes  Testing frequency: not testing  Recent results: not testing  Hypoglycemia? No, has nimo grider    Anthropometric Measurements:   Wt:   Wt Readings from Last 3 Encounters:   06/10/21 177 lb (80.3 kg)   05/13/21 174 lb 6.4 oz (79.1 kg)   04/29/21 179 lb (81.2 kg)      BMI:   BMI Readings from Last 3 Encounters:   06/10/21 33.44 kg/m²   05/13/21 32.95 kg/m²   04/29/21 33.82 kg/m²     Patient's stated goal weight:   7% Weight loss goal weight:     Physical Activity History:   Physical activity: \"not much\"  Frequency of activity:   Duration of activity:   Obstacles to activity:     Sleep: \"terrible\", good night-4 hours of sleep, snores    Food and Nutrition History:   Nutrition Awareness/Previous DSMES: No  Number of people in household: 1  Frequency of Meals Eaten away from home:daily  Food Availability Problems  Within the past 12 months, have you worried that your food would run out before you got money to buy more? No  Within the past 12 months, has the food you bought not lasted till the end of the month and you didn't have money to get more? No  Beverage consumption: diet pop-1 can, water- 6, 8 ounce cups, unsweet iced tea-32 ounces, coffee - 1 cup  Alcohol consumption: yes, beer-7 weekly, 1-2 at a time  Usual Food consumption: Works nights currently. Beginning day shift soon. Skips and delays meals and snacks  5-60 grams carbohydrate meals     Barriers:   -none          Follow Up Plan: 6-8 weeks, Contact information given.     Referring Provider: Shayne Swanson MD  Time spent with patient: 60 minutes

## 2021-09-03 RX ORDER — ATORVASTATIN CALCIUM 40 MG/1
40 TABLET, FILM COATED ORAL DAILY
Qty: 90 TABLET | Refills: 0 | Status: SHIPPED | OUTPATIENT
Start: 2021-09-03 | End: 2021-09-16 | Stop reason: SDUPTHER

## 2021-09-16 DIAGNOSIS — E78.2 MIXED HYPERLIPIDEMIA: ICD-10-CM

## 2021-09-16 RX ORDER — ATORVASTATIN CALCIUM 40 MG/1
40 TABLET, FILM COATED ORAL DAILY
Qty: 90 TABLET | Refills: 1 | Status: SHIPPED | OUTPATIENT
Start: 2021-09-16 | End: 2021-11-09 | Stop reason: SDUPTHER

## 2021-09-16 RX ORDER — FENOFIBRATE 145 MG/1
TABLET, COATED ORAL
Qty: 30 TABLET | Refills: 0 | Status: SHIPPED | OUTPATIENT
Start: 2021-09-16 | End: 2021-10-15 | Stop reason: SDUPTHER

## 2021-10-15 DIAGNOSIS — E78.2 MIXED HYPERLIPIDEMIA: ICD-10-CM

## 2021-10-15 RX ORDER — FENOFIBRATE 145 MG/1
TABLET, COATED ORAL
Qty: 30 TABLET | Refills: 0 | Status: SHIPPED | OUTPATIENT
Start: 2021-10-15 | End: 2021-11-15

## 2021-11-09 DIAGNOSIS — I10 ESSENTIAL HYPERTENSION: ICD-10-CM

## 2021-11-10 RX ORDER — ATORVASTATIN CALCIUM 40 MG/1
40 TABLET, FILM COATED ORAL DAILY
Qty: 90 TABLET | Refills: 0 | Status: SHIPPED | OUTPATIENT
Start: 2021-11-10 | End: 2022-02-11

## 2021-11-10 RX ORDER — AMLODIPINE BESYLATE AND BENAZEPRIL HYDROCHLORIDE 2.5; 1 MG/1; MG/1
1 CAPSULE ORAL DAILY
Qty: 30 CAPSULE | Refills: 0 | Status: SHIPPED | OUTPATIENT
Start: 2021-11-10 | End: 2021-12-03

## 2021-11-13 DIAGNOSIS — E78.2 MIXED HYPERLIPIDEMIA: ICD-10-CM

## 2021-11-15 RX ORDER — FENOFIBRATE 145 MG/1
TABLET, COATED ORAL
Qty: 30 TABLET | Refills: 0 | Status: SHIPPED | OUTPATIENT
Start: 2021-11-15 | End: 2021-12-13

## 2021-11-15 NOTE — TELEPHONE ENCOUNTER
Last seen: 6/10/21      Future Appointments   Date Time Provider Justyna Lupis   12/3/2021 12:30 PM DO [facility] [address]

## 2021-12-03 ENCOUNTER — OFFICE VISIT (OUTPATIENT)
Dept: INTERNAL MEDICINE CLINIC | Age: 46
End: 2021-12-03
Payer: COMMERCIAL

## 2021-12-03 VITALS
OXYGEN SATURATION: 98 % | HEART RATE: 111 BPM | WEIGHT: 177 LBS | BODY MASS INDEX: 32.57 KG/M2 | HEIGHT: 62 IN | SYSTOLIC BLOOD PRESSURE: 114 MMHG | DIASTOLIC BLOOD PRESSURE: 83 MMHG

## 2021-12-03 DIAGNOSIS — I10 DIABETES MELLITUS WITH COINCIDENT HYPERTENSION (HCC): ICD-10-CM

## 2021-12-03 DIAGNOSIS — T38.3X5A ADVERSE EFFECT OF METFORMIN, INITIAL ENCOUNTER: ICD-10-CM

## 2021-12-03 DIAGNOSIS — I10 ESSENTIAL HYPERTENSION: ICD-10-CM

## 2021-12-03 DIAGNOSIS — E11.69 TYPE 2 DIABETES MELLITUS WITH HYPERLIPIDEMIA (HCC): ICD-10-CM

## 2021-12-03 DIAGNOSIS — E78.5 TYPE 2 DIABETES MELLITUS WITH HYPERLIPIDEMIA (HCC): ICD-10-CM

## 2021-12-03 DIAGNOSIS — E11.65 UNCONTROLLED TYPE 2 DIABETES MELLITUS WITH HYPERGLYCEMIA (HCC): Primary | ICD-10-CM

## 2021-12-03 DIAGNOSIS — E11.9 DIABETES MELLITUS WITH COINCIDENT HYPERTENSION (HCC): ICD-10-CM

## 2021-12-03 DIAGNOSIS — Z12.11 SCREENING FOR COLON CANCER: ICD-10-CM

## 2021-12-03 LAB — HBA1C MFR BLD: 9.9 %

## 2021-12-03 PROCEDURE — G8417 CALC BMI ABV UP PARAM F/U: HCPCS | Performed by: FAMILY MEDICINE

## 2021-12-03 PROCEDURE — 90471 IMMUNIZATION ADMIN: CPT | Performed by: FAMILY MEDICINE

## 2021-12-03 PROCEDURE — 83036 HEMOGLOBIN GLYCOSYLATED A1C: CPT | Performed by: FAMILY MEDICINE

## 2021-12-03 PROCEDURE — 1036F TOBACCO NON-USER: CPT | Performed by: FAMILY MEDICINE

## 2021-12-03 PROCEDURE — G8482 FLU IMMUNIZE ORDER/ADMIN: HCPCS | Performed by: FAMILY MEDICINE

## 2021-12-03 PROCEDURE — 2022F DILAT RTA XM EVC RTNOPTHY: CPT | Performed by: FAMILY MEDICINE

## 2021-12-03 PROCEDURE — 90674 CCIIV4 VAC NO PRSV 0.5 ML IM: CPT | Performed by: FAMILY MEDICINE

## 2021-12-03 PROCEDURE — 99204 OFFICE O/P NEW MOD 45 MIN: CPT | Performed by: FAMILY MEDICINE

## 2021-12-03 PROCEDURE — G8427 DOCREV CUR MEDS BY ELIG CLIN: HCPCS | Performed by: FAMILY MEDICINE

## 2021-12-03 PROCEDURE — 3046F HEMOGLOBIN A1C LEVEL >9.0%: CPT | Performed by: FAMILY MEDICINE

## 2021-12-03 RX ORDER — AMLODIPINE BESYLATE AND BENAZEPRIL HYDROCHLORIDE 5; 20 MG/1; MG/1
1 CAPSULE ORAL DAILY
Qty: 30 CAPSULE | Refills: 3 | Status: SHIPPED | OUTPATIENT
Start: 2021-12-03 | End: 2022-05-11

## 2021-12-03 RX ORDER — GLIMEPIRIDE 2 MG/1
2 TABLET ORAL EVERY MORNING
Qty: 30 TABLET | Refills: 3 | Status: SHIPPED | OUTPATIENT
Start: 2021-12-03 | End: 2022-05-11

## 2021-12-03 ASSESSMENT — ENCOUNTER SYMPTOMS
DIARRHEA: 0
VOMITING: 0
COUGH: 0
NAUSEA: 0
SHORTNESS OF BREATH: 0
CONSTIPATION: 0

## 2021-12-03 NOTE — PROGRESS NOTES
sounds. Pulmonary:      Effort: Pulmonary effort is normal.      Breath sounds: Normal breath sounds. Musculoskeletal:      Right lower leg: No edema. Left lower leg: No edema. Neurological:      General: No focal deficit present. Mental Status: She is alert. Mental status is at baseline. A1c today in office was 9.9    Lab Results   Component Value Date    LABA1C 10.5 06/10/2021     Lab Results   Component Value Date    WBC 11.4 (H) 04/29/2021    HGB 13.8 04/29/2021    HCT 41.8 04/29/2021    MCV 84.3 04/29/2021     (H) 04/29/2021      Lab Results   Component Value Date     04/29/2021    K 5.1 04/29/2021    K 3.5 07/02/2019    CL 98 04/29/2021    CO2 24 04/29/2021    BUN 12 04/29/2021    CREATININE 0.6 04/29/2021    GLUCOSE 388 04/29/2021    CALCIUM 10.4 04/29/2021       Lab Results   Component Value Date    CHOL 170 10/18/2019    CHOL 175 04/17/2019     Lab Results   Component Value Date    TRIG 111 10/18/2019    TRIG 246 (H) 04/17/2019     Lab Results   Component Value Date    HDL 37 (L) 05/13/2021    HDL 41 10/18/2019    HDL 30 (L) 04/17/2019     Lab Results   Component Value Date    LDLCALC 130 (H) 05/13/2021    LDLCALC 107 (H) 10/18/2019    LDLCALC 96 04/17/2019     Lab Results   Component Value Date    LABVLDL 40 05/13/2021    LABVLDL 22 10/18/2019    LABVLDL 49 04/17/2019     No results found for: CHOLHDLRATIO     The 10-year ASCVD risk score (Tyler Councilman., et al., 2013) is: 3.3%    Values used to calculate the score:      Age: 40 years      Sex: Female      Is Non- : No      Diabetic: Yes      Tobacco smoker: No      Systolic Blood Pressure: 716 mmHg      Is BP treated: Yes      HDL Cholesterol: 37 mg/dL      Total Cholesterol: 207 mg/dL     Patient received counseling and, if relevant, printed instructions for all symptoms listed in CC and HPI, as well as for all diagnoses brought onto today's visit note below.  Typical counseling includes, but is not limited to, non-pharmacologic measures to manage listed symptoms and conditions; appropriate use, risks and benefits for all prescribed medications; potential interactions between medications both prescribed and OTC; diet; exercise; healthy behaviors; and goalsetting to improve health. Patient or responsible party was involved in shared decision making and had opportunity to have all questions answered. Except as noted below, all chronic problems have been reviewed and are stable to continue medications or other therapy as previously documented in the patient's chart, with changes per orders or documentation below:    1. Uncontrolled type 2 diabetes mellitus with hyperglycemia (HCC)  -     glimepiride (AMARYL) 2 MG tablet; Take 1 tablet by mouth every morning, Disp-30 tablet, R-3Normal  -     Nigel Murillo MD, Endocrinology, [facility]  -     Insulin Pen Needle 32G X 4 MM MISC; DAILY Starting Fri 12/3/2021, Disp-100 each, R-3, Normal  -     POCT glycosylated hemoglobin (Hb A1C)  2. Adverse effect of metformin, initial encounter  3. Diabetes mellitus with coincident hypertension ([facility] [address]  -     amLODIPine-benazepril (LOTREL) 5-20 MG per capsule; Take 1 capsule by mouth daily, Disp-30 capsule, R-3Normal  -     POCT glycosylated hemoglobin (Hb A1C)  4. Type 2 diabetes mellitus with hyperlipidemia (HCC)  -     POCT glycosylated hemoglobin (Hb A1C)  5. Essential hypertension  -     amLODIPine-benazepril (LOTREL) 5-20 MG per capsule; Take 1 capsule by mouth daily, Disp-30 capsule, R-3Normal  6.  Screening for colon cancer  -     Fit-DNA (Cologuard)          Problem List        Unprioritized    Essential hypertension    Relevant Medications    amLODIPine-benazepril (LOTREL) 5-20 MG per capsule    Uncontrolled type 2 diabetes mellitus with hyperglycemia ([address] - Primary    Relevant Medications    blood glucose monitor kit and supplies    blood glucose monitor strips    Alcohol Swabs 70 % PADS    Lancets MISC insulin glargine (LANTUS SOLOSTAR) 100 UNIT/ML injection pen    glimepiride (AMARYL) 2 MG tablet    Insulin Pen Needle 32G X 4 MM MISC    Other Relevant Orders    Lashon Danielle MD, Endocrinology, [facility]    POCT glycosylated hemoglobin (Hb A1C)        Orders Placed This Encounter   Procedures    Fit-DNA (Cologuard)     This test is performed by an external laboratory and is used for result attachment only. It is required that this order requisition be faxed to: Where Was it Filmed @ [phone]. See [email or web address] for further information.  INFLUENZA, MDCK QUADV, 2 YRS AND OLDER, IM, PF, PREFILL SYR OR SDV, 0.5ML (Pedro Artist, PF)   Lashon Danielle MD, Endocrinology, [facility]     Referral Priority:   Routine     Referral Type:   Eval and Treat     Referral Reason:   Specialty Services Required     Referred to Provider:   Génesis Liriano MD     Requested Specialty:   Endocrinology     Number of Visits Requested:   1    POCT glycosylated hemoglobin (Hb A1C)       No follow-ups on file. [facility] - Internal Medicine and Pediatrics  Dr. Fanny Joyce D.O.  - Family Medicine and OMT      Usual doctor's hours are:     Monday 7:30 am to 6:00 pm           Tuesday  7:30 am to 5:00 pm                                               Wednesday 7:30 am to 5:00 pm                                               Thursday 7:30 am to 5:00 pm                                               Friday 7:30 am to 4:00 pm           Saturdays, Sundays, and after hours: E-Visits are available    We observe most federal holidays and Good Friday. We ask that you only contact the office one time per issue or question, and please allow one full business day for a call back. Calling us back multiple times keeps us from being able to complete the work efficiently for you and our other patients.     For medication renewals, please call your pharmacist to contact us, and be sure to allow at least 3 business days for processing before you need to  your medication. If you are sick or need an appointment that hasn't been planned, same day appointments are available every day the office is open: Monday, Tuesday, Wednesday, Thursday, and Friday. Call during office hours to schedule, even if it may not be with your regular physician. You may also call the office after 8 am on office days if you need to be seen from an issue the night before. During hours when the office is not normally open, your call will go to the messaging service which cannot provide any service other than paging the doctor. No prescriptions or other nonurgent matters will be handled and no voicemail is available, so please call back during office hours for these matters.        Electronically signed by Lachelle Fabian DO on 12/3/2021 at 12:51 PM.

## 2021-12-17 DIAGNOSIS — E11.65 UNCONTROLLED TYPE 2 DIABETES MELLITUS WITH HYPERGLYCEMIA (HCC): ICD-10-CM

## 2021-12-17 LAB
CHOLESTEROL, TOTAL: 125 MG/DL (ref 0–199)
HDLC SERPL-MCNC: 48 MG/DL (ref 40–60)
LDL CHOLESTEROL CALCULATED: 63 MG/DL
TRIGL SERPL-MCNC: 69 MG/DL (ref 0–150)
VLDLC SERPL CALC-MCNC: 14 MG/DL

## 2022-02-11 ENCOUNTER — OFFICE VISIT (OUTPATIENT)
Dept: INTERNAL MEDICINE CLINIC | Age: 47
End: 2022-02-11
Payer: COMMERCIAL

## 2022-02-11 VITALS
SYSTOLIC BLOOD PRESSURE: 127 MMHG | HEART RATE: 110 BPM | OXYGEN SATURATION: 97 % | BODY MASS INDEX: 32.37 KG/M2 | WEIGHT: 177 LBS | DIASTOLIC BLOOD PRESSURE: 87 MMHG

## 2022-02-11 DIAGNOSIS — Z23 NEED FOR VACCINATION AGAINST STREPTOCOCCUS PNEUMONIAE: ICD-10-CM

## 2022-02-11 DIAGNOSIS — E11.69 TYPE 2 DIABETES MELLITUS WITH HYPERLIPIDEMIA (HCC): ICD-10-CM

## 2022-02-11 DIAGNOSIS — T38.3X5A ADVERSE EFFECT OF METFORMIN, INITIAL ENCOUNTER: ICD-10-CM

## 2022-02-11 DIAGNOSIS — E11.65 UNCONTROLLED TYPE 2 DIABETES MELLITUS WITH HYPERGLYCEMIA (HCC): Primary | ICD-10-CM

## 2022-02-11 DIAGNOSIS — I10 DIABETES MELLITUS WITH COINCIDENT HYPERTENSION (HCC): ICD-10-CM

## 2022-02-11 DIAGNOSIS — Z23 NEED FOR HEPATITIS B VACCINATION: ICD-10-CM

## 2022-02-11 DIAGNOSIS — E11.9 DIABETES MELLITUS WITH COINCIDENT HYPERTENSION (HCC): ICD-10-CM

## 2022-02-11 DIAGNOSIS — E78.5 TYPE 2 DIABETES MELLITUS WITH HYPERLIPIDEMIA (HCC): ICD-10-CM

## 2022-02-11 PROCEDURE — 99214 OFFICE O/P EST MOD 30 MIN: CPT | Performed by: FAMILY MEDICINE

## 2022-02-11 PROCEDURE — 90732 PPSV23 VACC 2 YRS+ SUBQ/IM: CPT | Performed by: FAMILY MEDICINE

## 2022-02-11 PROCEDURE — 90471 IMMUNIZATION ADMIN: CPT | Performed by: FAMILY MEDICINE

## 2022-02-11 RX ORDER — ATORVASTATIN CALCIUM 40 MG/1
TABLET, FILM COATED ORAL
Qty: 90 TABLET | Refills: 1 | Status: SHIPPED | OUTPATIENT
Start: 2022-02-11

## 2022-02-11 ASSESSMENT — ENCOUNTER SYMPTOMS
CONSTIPATION: 0
NAUSEA: 0
DIARRHEA: 0
VOMITING: 0
COUGH: 0
SHORTNESS OF BREATH: 0

## 2022-02-11 NOTE — TELEPHONE ENCOUNTER
Last office visit : 02/11/2022    Future Appointments   Date Time Provider Justyna Baugh   5/13/2022  7:45 AM DO ELDER Rossi&ALEJANDRO GILL

## 2022-02-18 PROCEDURE — 90471 IMMUNIZATION ADMIN: CPT | Performed by: FAMILY MEDICINE

## 2022-02-18 PROCEDURE — 90746 HEPB VACCINE 3 DOSE ADULT IM: CPT | Performed by: FAMILY MEDICINE

## 2022-05-11 DIAGNOSIS — I10 ESSENTIAL HYPERTENSION: ICD-10-CM

## 2022-05-11 DIAGNOSIS — I10 DIABETES MELLITUS WITH COINCIDENT HYPERTENSION (HCC): ICD-10-CM

## 2022-05-11 DIAGNOSIS — E11.65 UNCONTROLLED TYPE 2 DIABETES MELLITUS WITH HYPERGLYCEMIA (HCC): ICD-10-CM

## 2022-05-11 DIAGNOSIS — E11.9 DIABETES MELLITUS WITH COINCIDENT HYPERTENSION (HCC): ICD-10-CM

## 2022-05-11 RX ORDER — AMLODIPINE BESYLATE AND BENAZEPRIL HYDROCHLORIDE 5; 20 MG/1; MG/1
1 CAPSULE ORAL DAILY
Qty: 60 CAPSULE | Refills: 5 | Status: SHIPPED | OUTPATIENT
Start: 2022-05-11 | End: 2023-05-11

## 2022-05-11 RX ORDER — GLIMEPIRIDE 2 MG/1
TABLET ORAL
Qty: 60 TABLET | Refills: 5 | Status: SHIPPED | OUTPATIENT
Start: 2022-05-11

## 2022-05-11 NOTE — TELEPHONE ENCOUNTER
Requested Prescriptions     Pending Prescriptions Disp Refills    amLODIPine-benazepril (LOTREL) 5-20 MG per capsule [Pharmacy Med Name: amLODIPine Besy-Benazepril HCl 5-20 MG Oral Capsule] 60 capsule 5     Sig: TAKE 1 CAPSULE BY MOUTH  DAILY    glimepiride (AMARYL) 2 MG tablet [Pharmacy Med Name: Glimepiride 2 MG Oral Tablet] 60 tablet 5     Sig: TAKE 1 TABLET BY MOUTH IN  THE MORNING       Patient requesting a medication refill.   Last filled on:   [facility]  Next office visit: Visit date not found  Last regular office visit: 2/11/2022

## 2022-05-16 DIAGNOSIS — E78.2 MIXED HYPERLIPIDEMIA: ICD-10-CM

## 2022-05-16 DIAGNOSIS — E11.65 UNCONTROLLED TYPE 2 DIABETES MELLITUS WITH HYPERGLYCEMIA (HCC): ICD-10-CM

## 2022-05-16 RX ORDER — FENOFIBRATE 145 MG/1
TABLET, COATED ORAL
Qty: 90 TABLET | Refills: 1 | Status: SHIPPED | OUTPATIENT
Start: 2022-05-16

## 2022-05-16 NOTE — TELEPHONE ENCOUNTER
Requested Prescriptions     Pending Prescriptions Disp Refills    fenofibrate (TRICOR) 145 MG tablet 90 tablet 1     Sig: TAKE 1 TABLET BY MOUTH EVERY DAY       Patient requesting a medication refill.   Last filled on:   [facility]  Next office visit: Visit date not found  Last regular office visit: 2/11/2022

## 2024-12-18 NOTE — PROGRESS NOTES
Amount of time spent with the patient: less than 15 minutes (minor acuity established patient)     Romel Huff (:  1975) is a 40 y.o. female,Established patient, here for evaluation of the following chief complaint(s):  Follow-up      SUBJECTIVE:  22 - follow up. Started the Amaryl, no issues with hypoglycemia. Has not been able to get in with Endocrinology  Not sure what her morning sugar levels have been. Does not feel poorly. No changes in her eyesight. Next A1c due in early March. She can get this done first week of march and I will contact her with results and any changes we need to make to her insulin regimen    Needs Hep B series, needs PNA vax. Hep B series: 0 mo, 1 mo, and 3 mo.     12.3.22 -- new patient  Depression -- doing well on wellbutrin 200mg ER tablet. No issues, no desire to wean down or adjust to anything else. DM:  A1c in office was 9.9 on 12.3.22  Stopped taking glyburide and the metformin, only on insulin at this time. Was put on insulin because she had worsening diarrhea with the metformin  Never restarted the glyburide, ok with starting amaryl. Is now back on dayshift so scheduled eating is more consistent. Using 22 units in the morning, none at night. Not using meal time insulin. For some reason pt was taking lisinopril HCTZ as well as the amlodipine-benazapril. Will d/c the lisinopril/HCTZ and continue only the amlodipine/benazepril dosing. Has not had dilated eye exam        I have reviewed the chart notes available from myself and other providers. I have reviewed and addressed all active problems and created or updated the problems list in detail, as needed    I have extensively reviewed and reconciled the medication list, discontinued medications not taking or no longer appropriate, and updated the active meds list    OBJECTIVE:  Review of Systems   Constitutional: Negative for chills and fever. Respiratory: Negative for cough and shortness of breath. Cardiovascular: Negative for leg swelling.    Gastrointestinal: Negative for constipation, diarrhea, nausea and vomiting. Endocrine: Negative for polyuria. Genitourinary: Negative for frequency. Skin: Negative for rash. Vitals:    02/11/22 0750   BP: 127/87   Pulse: 110   SpO2: 97%   Weight: 177 lb (80.3 kg)      Body mass index is 32.37 kg/m². Physical Exam  Constitutional:       Appearance: Normal appearance. She is obese. Cardiovascular:      Rate and Rhythm: Normal rate and regular rhythm. Pulses: Normal pulses. Heart sounds: Normal heart sounds. Pulmonary:      Effort: Pulmonary effort is normal.      Breath sounds: Normal breath sounds. Musculoskeletal:      Right lower leg: No edema. Left lower leg: No edema. Neurological:      General: No focal deficit present. Mental Status: She is alert. Mental status is at baseline.          Lab Results   Component Value Date    LABA1C 9.9 12/03/2021     Lab Results   Component Value Date    WBC 11.4 (H) 04/29/2021    HGB 13.8 04/29/2021    HCT 41.8 04/29/2021    MCV 84.3 04/29/2021     (H) 04/29/2021      Lab Results   Component Value Date     04/29/2021    K 5.1 04/29/2021    K 3.5 07/02/2019    CL 98 04/29/2021    CO2 24 04/29/2021    BUN 12 04/29/2021    CREATININE 0.6 04/29/2021    GLUCOSE 388 04/29/2021    CALCIUM 10.4 04/29/2021       Lab Results   Component Value Date    CHOL 125 12/17/2021    CHOL 170 10/18/2019    CHOL 175 04/17/2019     Lab Results   Component Value Date    TRIG 69 12/17/2021    TRIG 111 10/18/2019    TRIG 246 (H) 04/17/2019     Lab Results   Component Value Date    HDL 48 12/17/2021    HDL 37 (L) 05/13/2021    HDL 41 10/18/2019     Lab Results   Component Value Date    LDLCALC 63 12/17/2021    LDLCALC 130 (H) 05/13/2021    LDLCALC 107 (H) 10/18/2019     Lab Results   Component Value Date    LABVLDL 14 12/17/2021    LABVLDL 40 05/13/2021    LABVLDL 22 10/18/2019     No results found for: CHOLHDLRATIO     The ASCVD Risk score (Chris Morales, et al., 2013) failed to calculate for the following reasons: The valid total cholesterol range is 130 to 320 mg/dL     Patient received counseling and, if relevant, printed instructions for all symptoms listed in CC and HPI, as well as for all diagnoses brought onto today's visit note below. Typical counseling includes, but is not limited to, non-pharmacologic measures to manage listed symptoms and conditions; appropriate use, risks and benefits for all prescribed medications; potential interactions between medications both prescribed and OTC; diet; exercise; healthy behaviors; and goalsetting to improve health. Patient or responsible party was involved in shared decision making and had opportunity to have all questions answered. Except as noted below, all chronic problems have been reviewed and are stable to continue medications or other therapy as previously documented in the patient's chart, with changes per orders or documentation below:    1. Uncontrolled type 2 diabetes mellitus with hyperglycemia (HCC)  -     Hemoglobin A1C; Future  2. Diabetes mellitus with coincident hypertension ([facility]  3. Adverse effect of metformin, initial encounter  4. Type 2 diabetes mellitus with hyperlipidemia ([facility]  5. Need for vaccination against Streptococcus pneumoniae  6.  Need for hepatitis B vaccination        Problem List        Unprioritized    Uncontrolled type 2 diabetes mellitus with hyperglycemia ([facility] - Primary    Relevant Medications    blood glucose monitor kit and supplies    blood glucose monitor strips    Alcohol Swabs 70 % PADS    Lancets MISC    insulin glargine (LANTUS SOLOSTAR) 100 UNIT/ML injection pen    glimepiride (AMARYL) 2 MG tablet    Insulin Pen Needle 32G X 4 MM MISC    Other Relevant Orders    Hemoglobin A1C        Orders Placed This Encounter   Procedures    PNEUMOVAX 23 subcutaneous/IM (Pneumococcal polysaccharide vaccine 23-valent >= 3yo)    Hep B Vaccine Ped/Adol 3-Dose (RECOMBIVAX HB)    Hemoglobin A1C     Standing Status: Future     Standing Expiration Date:   2/11/2023       Return in about 3 months (around 5/26/2022) for virtual visit. [facility] - Internal Medicine and Pediatrics  Dr. Beka Morris D.O.  - Family Medicine and T      Usual doctor's hours are:     Monday 7:30 am to 6:00 pm           Tuesday  7:30 am to 5:00 pm                                               Wednesday 7:30 am to 5:00 pm                                               Thursday 7:30 am to 5:00 pm                                               Friday 7:30 am to 4:00 pm           Saturdays, Sundays, and after hours: E-Visits are available    We observe most federal holidays and Good Friday. We ask that you only contact the office one time per issue or question, and please allow one full business day for a call back. Calling us back multiple times keeps us from being able to complete the work efficiently for you and our other patients. For medication renewals, please call your pharmacist to contact us, and be sure to allow at least 3 business days for processing before you need to  your medication. If you are sick or need an appointment that hasn't been planned, same day appointments are available every day the office is open: Monday, Tuesday, Wednesday, Thursday, and Friday. Call during office hours to schedule, even if it may not be with your regular physician. You may also call the office after 8 am on office days if you need to be seen from an issue the night before. During hours when the office is not normally open, your call will go to the messaging service which cannot provide any service other than paging the doctor. No prescriptions or other nonurgent matters will be handled and no voicemail is available, so please call back during office hours for these matters.        Electronically signed by Beka Morris DO on 2/11/2022 at 10:09 AM.

## (undated) DEVICE — BITE BLK 60FR GRN ENDOSCP AD W STRP SLD DISPOSABLE

## (undated) DEVICE — DEVICE LCK HLDR WIRE MICROVASIVE RAP EXCHG OLY FUJINON RX

## (undated) DEVICE — ENDOSCOPY KIT: Brand: MEDLINE INDUSTRIES, INC.

## (undated) DEVICE — PMI DISPOSABLE PUNCTURE CLOSURE DEVICE / SUTURE GRASPER: Brand: PMI

## (undated) DEVICE — SUTURE SZ 0 27IN 5/8 CIR UR-6  TAPER PT VIOLET ABSRB VICRYL J603H

## (undated) DEVICE — GLOVE ORANGE PI 7   MSG9070

## (undated) DEVICE — TROCAR ENDOSCP L100MM DIA5MM BLDELSS STBL SL OBT RADLUC

## (undated) DEVICE — TROCAR ENDOSCP L100MM DIA11MM DIL TIP STBL SL DISP ENDOPATH

## (undated) DEVICE — ADTEC SINGLE USE HOOK SCISSORS, SHAFT ONLY, MONOPOLAR, STRAIGHT, WORKING LENGTH: 12 1/4", (310 MM), DIAM. 5 MM, BLUNT/BLUNT, INSULATED, SINGLE ACTION, STERILE, DISPOSABLE, PACKAGE OF 10 PIECES: Brand: AESCULAP

## (undated) DEVICE — GOWN SIRUS NONREIN XL W/TWL: Brand: MEDLINE INDUSTRIES, INC.

## (undated) DEVICE — KIT OR ROOM TURNOVER W/STRAP

## (undated) DEVICE — SOLUTION IV IRRIG POUR BRL 0.9% SODIUM CHL 2F7124

## (undated) DEVICE — DRAPE,LAP,CHOLE,W/TROUGHS,STERILE: Brand: MEDLINE

## (undated) DEVICE — ELECTRODE PT RET AD L9FT HI MOIST COND ADH HYDRGEL CORDED

## (undated) DEVICE — TROCAR ENDOSCP L100MM DIA5MM DIL TIP STBL SL W OPTVW

## (undated) DEVICE — BAG SPEC REM 224ML W4XL6IN DIA10MM 1 HND GYN DISP ENDOPCH

## (undated) DEVICE — NEEDLE HYPO 25GA L1.5IN BVL ORIENTED ECLIPSE

## (undated) DEVICE — GARMENT COMPR STD FOR 17IN CALF UNIF THER FLOTRN

## (undated) DEVICE — INSUFFLATION NEEDLE TO ESTABLISH PNEUMOPERITONEUM.: Brand: INSUFFLATION NEEDLE

## (undated) DEVICE — CHLORAPREP 26ML ORANGE

## (undated) DEVICE — [HIGH FLOW INSUFFLATOR,  DO NOT USE IF PACKAGE IS DAMAGED,  KEEP DRY,  KEEP AWAY FROM SUNLIGHT,  PROTECT FROM HEAT AND RADIOACTIVE SOURCES.]: Brand: PNEUMOSURE

## (undated) DEVICE — CANISTER, RIGID, 1200CC: Brand: MEDLINE INDUSTRIES, INC.

## (undated) DEVICE — SKIN AFFIX SURG ADHESIVE 72/CS 0.55ML: Brand: MEDLINE

## (undated) DEVICE — Device

## (undated) DEVICE — DRAPE C ARM UNIV W41XL74IN CLR PLAS XR VELC CLSR POLY STRP

## (undated) DEVICE — SYRINGE MED 30ML STD CLR PLAS LUERLOCK TIP N CTRL DISP

## (undated) DEVICE — LAPAROSCOPY PK

## (undated) DEVICE — SNARE ENDOSCP AD L240CM LOOP W10MM SHTH DIA2.4MM RND INSUL

## (undated) DEVICE — SPHINCTEROTOME: Brand: JAGTOME RX 39

## (undated) DEVICE — RETRIEVAL BALLOON CATHETER: Brand: EXTRACTOR™ PRO RX

## (undated) DEVICE — TUBING, SUCTION, 1/4" X 12', STRAIGHT: Brand: MEDLINE

## (undated) DEVICE — INDICATED FOR USE DURING OPEN AND LAPAROSCOPIC CHOLECYSTECTOMY PROCEDURES TO INJECT RADIOPAQUE MEDIA THROUGH THE CYSTIC DUCT INTO THE BILIARY TREE.: Brand: AEROSTAT®

## (undated) DEVICE — GLOVE ORANGE PI 7 1/2   MSG9075

## (undated) DEVICE — HIGH PERFORMANCE GUIDEWIRE: Brand: JAGWIRE

## (undated) DEVICE — SUTURE VCRL SZ 4-0 L18IN ABSRB UD L19MM PS-2 3/8 CIR PRIM J496H

## (undated) DEVICE — WIREGUIDED RETRIEVAL BASKET: Brand: TRAPEZOID RX

## (undated) DEVICE — BILIARY STENT
Type: IMPLANTABLE DEVICE | Status: NON-FUNCTIONAL
Brand: ADVANIX™ BILIARY
Removed: 2019-07-01

## (undated) DEVICE — CORD ES L10FT MPLR LAP

## (undated) DEVICE — RESERVOIR,SUCTION,100CC,SILICONE: Brand: MEDLINE

## (undated) DEVICE — GOWN SIRUS NONREIN LG W/TWL: Brand: MEDLINE INDUSTRIES, INC.

## (undated) DEVICE — LOOP LIG SUT SZ 0 L18IN ABSRB POLYDIOXANONE MFIL PDS II

## (undated) DEVICE — SOEHENDRA LITHOTRIPSY CABLE: Brand: SOEHENDRA

## (undated) DEVICE — ORGANIZER MED DEV W76XL86CM PCH W25XL28CM FOR ENDOSCP TBL

## (undated) DEVICE — APPLIER CLP M L L11.4IN DIA10MM ENDOSCP ROT MULT FOR LIG

## (undated) DEVICE — RX DELIVERY SYSTEM: Brand: NAVIFLEX™ RX DELIVERY SYSTEM

## (undated) DEVICE — DRAIN SURG 19FR 100% SIL RADPQ RND CHN FULL FLUT

## (undated) DEVICE — SYRINGE 20ML LL S/C 50

## (undated) DEVICE — COVER LT HNDL BLU PLAS

## (undated) DEVICE — THE DISPOSABLE RAPTOR GRASPING DEVICE IS USED TO GRASP TISSUE AND/OR RETRIEVE FOREIGN BODIES, EXCISED TISSUE AND STENTS DURING ENDOSCOPIC PROCEDURES.: Brand: RAPTOR